# Patient Record
Sex: FEMALE | Race: WHITE | NOT HISPANIC OR LATINO | Employment: PART TIME | URBAN - NONMETROPOLITAN AREA
[De-identification: names, ages, dates, MRNs, and addresses within clinical notes are randomized per-mention and may not be internally consistent; named-entity substitution may affect disease eponyms.]

---

## 2021-02-22 ENCOUNTER — MEDICAL CORRESPONDENCE (OUTPATIENT)
Dept: HEALTH INFORMATION MANAGEMENT | Facility: HOSPITAL | Age: 56
End: 2021-02-22

## 2021-03-16 ENCOUNTER — TRANSFERRED RECORDS (OUTPATIENT)
Dept: HEALTH INFORMATION MANAGEMENT | Facility: CLINIC | Age: 56
End: 2021-03-16

## 2022-06-04 ENCOUNTER — HOSPITAL ENCOUNTER (EMERGENCY)
Facility: HOSPITAL | Age: 57
Discharge: HOME | End: 2022-06-04
Attending: PHYSICIAN ASSISTANT | Admitting: PHYSICIAN ASSISTANT
Payer: COMMERCIAL

## 2022-06-04 VITALS
DIASTOLIC BLOOD PRESSURE: 70 MMHG | SYSTOLIC BLOOD PRESSURE: 106 MMHG | HEART RATE: 96 BPM | TEMPERATURE: 100 F | RESPIRATION RATE: 16 BRPM

## 2022-06-04 DIAGNOSIS — B34.9 VIRAL ILLNESS: ICD-10-CM

## 2022-06-04 PROCEDURE — G0463 HOSPITAL OUTPT CLINIC VISIT: HCPCS

## 2022-06-04 PROCEDURE — C9803 HOPD COVID-19 SPEC COLLECT: HCPCS

## 2022-06-04 PROCEDURE — 99213 OFFICE O/P EST LOW 20 MIN: CPT | Performed by: PHYSICIAN ASSISTANT

## 2022-06-04 PROCEDURE — 87637 SARSCOV2&INF A&B&RSV AMP PRB: CPT | Performed by: PHYSICIAN ASSISTANT

## 2022-06-04 ASSESSMENT — ENCOUNTER SYMPTOMS
CHILLS: 1
MYALGIAS: 1
WHEEZING: 0
FEVER: 1
SORE THROAT: 1
CARDIOVASCULAR NEGATIVE: 1
HEADACHES: 0
NEUROLOGICAL NEGATIVE: 1
FATIGUE: 1
COUGH: 1
SHORTNESS OF BREATH: 0

## 2022-06-04 NOTE — ED TRIAGE NOTES
Patient presents to urgent care for fever, body aches, congestion, chills that started last night. Last dose tylenol 1600.

## 2022-06-04 NOTE — LETTER
[place] [address] [place] [id number]  Main: [phone]  Dept: [phone]      June 4, 2022      Re: Gabbie Ndiaye      TO WHOM IT MAY CONCERN:    Gabbie Ndiaye was evaluated today in urgent care. She does have a pending test, but may return to work 24 hrs after fevers have resolved if testing is negative. I expect the course if illness to range from 3-7 days. Please excuse Gabbie from any missed work during this time.         Sincerely,      VADIM Finley, PA-C   6/4/2022   5:59 PM

## 2022-06-04 NOTE — DISCHARGE INSTRUCTIONS
Zinc, vitamin D3 [id number] international unit(s) daily for the next 1-2 weeks.   Aspirin - 2 baby vs one 325mg for the next 2-3 weeks  Rotate ibuprofen 600-800mg with tylenol 1000mg every 4 hrs.   Sips of fluids throughout the day  Small frequent meals for salt/electrolytes & sugar/carbs  We will call with results - back here with worsening, onset shortness of breath, concerns.

## 2022-06-05 LAB
FLUAV RNA SPEC QL NAA+PROBE: NEGATIVE
FLUBV RNA RESP QL NAA+PROBE: NEGATIVE
RSV RNA SPEC NAA+PROBE: NEGATIVE
SARS-COV-2 RNA RESP QL NAA+PROBE: POSITIVE

## 2022-06-05 NOTE — ED PROVIDER NOTES
History     Chief Complaint   Patient presents with     Fever     Chills     Generalized Body Aches     HPI  Gabbie Ndiaye is a 57 year old female with PMHx of LBBB per her report, who presents ambulatory with acute onset of F/C, myalgia, congestion. Sx started last night with body aches, this AM congestion & ST. Denies any significant cough and no shortness of breath. She reports tylenol does help with fevers and body aches. Several residents have been CV19 positive, prompting visit for evaluation.      Allergies:  No Known Allergies    Problem List:    There are no problems to display for this patient.       Past Medical History:    No past medical history on file.    Past Surgical History:    No past surgical history on file.    Family History:    No family history on file.    Social History:  Marital Status:   [4]        Medications:    No current outpatient medications on file.        Review of Systems   Constitutional: Positive for chills, fatigue and fever.   HENT: Positive for congestion, postnasal drip and sore throat.    Respiratory: Positive for cough. Negative for shortness of breath and wheezing.    Cardiovascular: Negative.    Musculoskeletal: Positive for myalgias.   Skin: Negative for pallor.   Neurological: Negative.  Negative for headaches.       Physical Exam   BP: 106/70  Pulse: 96  Temp: 100  F (37.8  C)  Resp: 16      Physical Exam  Vitals and nursing note reviewed.   Constitutional:       General: She is not in acute distress.     Appearance: She is not toxic-appearing.   HENT:      Head: Normocephalic and atraumatic.      Right Ear: Tympanic membrane normal.      Left Ear: Tympanic membrane normal.      Mouth/Throat:      Mouth: Mucous membranes are moist.      Pharynx: Oropharynx is clear. No posterior oropharyngeal erythema.   Eyes:      Comments: Mild conjunctival injection bilaterally   Cardiovascular:      Rate and Rhythm: Normal rate and regular rhythm.      Heart sounds:  No murmur heard.  Pulmonary:      Effort: Pulmonary effort is normal.      Breath sounds: Normal breath sounds.   Skin:     General: Skin is warm and dry.   Neurological:      Mental Status: She is alert and oriented to person, place, and time.         ED Course       Assessments & Plan (with Medical Decision Making)     I have reviewed the nursing notes.  I have reviewed the findings, diagnosis, plan and need for follow up with the patient.    There are no discharge medications for this patient.      Final diagnoses:   Viral illness   Continue with supportive treatment.  Discussed return to work 24 hours after fevers have resolved versus current isolation guidelines if COVID-19 is positive.  Follow-up primary care next week with poor progression, seeking care here/ER sooner with worsening.  We will contact patient with swab results.    6/4/2022   [facility]     Ryland Whipple PA  06/04/22 2032

## 2022-06-06 ENCOUNTER — TELEPHONE (OUTPATIENT)
Dept: NURSING | Facility: CLINIC | Age: 57
End: 2022-06-06

## 2022-06-07 NOTE — TELEPHONE ENCOUNTER
Patient classified as COVID treatment eligible by Epic high risk algorithm:  No    Coronavirus (COVID-19) Notification    Reason for call  Notify of POSITIVE COVID-19 lab result, assess symptoms,  review [facility] recommendations    Lab Result   Lab test for 2019-nCoV rRt-PCR or SARS-COV-2 PCR  Oropharyngeal AND/OR nasopharyngeal swabs were POSITIVE for 2019-nCoV RNA [OR] SARS-COV-2 RNA (COVID-19) RNA     We have been unable to reach patient by phone at this time to notify of their Positive COVID-19 result.    Left voicemail message requesting a call back to [phone] [facility] for results.        A Positive COVID-19 letter will be sent via [employer] or the mail. (Exception, no letters sent to Presurgerical/Preprocedure Patients)    Tegan Francis LPN

## 2022-07-18 ENCOUNTER — HOSPITAL ENCOUNTER (EMERGENCY)
Facility: HOSPITAL | Age: 57
Discharge: HOME | End: 2022-07-18
Attending: FAMILY MEDICINE | Admitting: FAMILY MEDICINE
Payer: COMMERCIAL

## 2022-07-18 ENCOUNTER — APPOINTMENT (OUTPATIENT)
Dept: GENERAL RADIOLOGY | Facility: HOSPITAL | Age: 57
End: 2022-07-18
Attending: FAMILY MEDICINE
Payer: COMMERCIAL

## 2022-07-18 VITALS
HEART RATE: 82 BPM | SYSTOLIC BLOOD PRESSURE: 126 MMHG | RESPIRATION RATE: 18 BRPM | TEMPERATURE: 97.6 F | DIASTOLIC BLOOD PRESSURE: 92 MMHG | WEIGHT: 258 LBS | OXYGEN SATURATION: 96 %

## 2022-07-18 DIAGNOSIS — R00.2 PALPITATIONS: ICD-10-CM

## 2022-07-18 DIAGNOSIS — Z86.79 HISTORY OF LEFT BUNDLE BRANCH BLOCK (LBBB): ICD-10-CM

## 2022-07-18 LAB
ALBUMIN SERPL-MCNC: 3.5 G/DL (ref 3.4–5)
ALP SERPL-CCNC: 89 U/L (ref 40–150)
ALT SERPL W P-5'-P-CCNC: 34 U/L (ref 0–50)
ANION GAP SERPL CALCULATED.3IONS-SCNC: 5 MMOL/L (ref 3–14)
AST SERPL W P-5'-P-CCNC: 20 U/L (ref 0–45)
BASOPHILS # BLD AUTO: 0 10E3/UL (ref 0–0.2)
BASOPHILS NFR BLD AUTO: 1 %
BILIRUB SERPL-MCNC: 0.7 MG/DL (ref 0.2–1.3)
BUN SERPL-MCNC: 15 MG/DL (ref 7–30)
CALCIUM SERPL-MCNC: 8.9 MG/DL (ref 8.5–10.1)
CHLORIDE BLD-SCNC: 110 MMOL/L (ref 94–109)
CO2 SERPL-SCNC: 24 MMOL/L (ref 20–32)
CREAT SERPL-MCNC: 0.52 MG/DL (ref 0.52–1.04)
EOSINOPHIL # BLD AUTO: 0.1 10E3/UL (ref 0–0.7)
EOSINOPHIL NFR BLD AUTO: 2 %
ERYTHROCYTE [DISTWIDTH] IN BLOOD BY AUTOMATED COUNT: 14.1 % (ref 10–15)
GFR SERPL CREATININE-BSD FRML MDRD: >90 ML/MIN/1.73M2
GLUCOSE BLD-MCNC: 100 MG/DL (ref 70–99)
HCT VFR BLD AUTO: 42.1 % (ref 35–47)
HGB BLD-MCNC: 13.9 G/DL (ref 11.7–15.7)
HOLD SPECIMEN: NORMAL
IMM GRANULOCYTES # BLD: 0 10E3/UL
IMM GRANULOCYTES NFR BLD: 0 %
LYMPHOCYTES # BLD AUTO: 2 10E3/UL (ref 0.8–5.3)
LYMPHOCYTES NFR BLD AUTO: 34 %
MCH RBC QN AUTO: 28.7 PG (ref 26.5–33)
MCHC RBC AUTO-ENTMCNC: 33 G/DL (ref 31.5–36.5)
MCV RBC AUTO: 87 FL (ref 78–100)
MONOCYTES # BLD AUTO: 0.6 10E3/UL (ref 0–1.3)
MONOCYTES NFR BLD AUTO: 10 %
NEUTROPHILS # BLD AUTO: 3.1 10E3/UL (ref 1.6–8.3)
NEUTROPHILS NFR BLD AUTO: 53 %
NRBC # BLD AUTO: 0 10E3/UL
NRBC BLD AUTO-RTO: 0 /100
PLATELET # BLD AUTO: 343 10E3/UL (ref 150–450)
POTASSIUM BLD-SCNC: 3.8 MMOL/L (ref 3.4–5.3)
PROT SERPL-MCNC: 7.3 G/DL (ref 6.8–8.8)
RBC # BLD AUTO: 4.84 10E6/UL (ref 3.8–5.2)
SODIUM SERPL-SCNC: 139 MMOL/L (ref 133–144)
T4 FREE SERPL-MCNC: 0.93 NG/DL (ref 0.76–1.46)
TROPONIN I SERPL HS-MCNC: 8 NG/L
TSH SERPL DL<=0.005 MIU/L-ACNC: 0.31 MU/L (ref 0.4–4)
WBC # BLD AUTO: 5.8 10E3/UL (ref 4–11)

## 2022-07-18 PROCEDURE — 93005 ELECTROCARDIOGRAM TRACING: CPT

## 2022-07-18 PROCEDURE — 85025 COMPLETE CBC W/AUTO DIFF WBC: CPT | Performed by: NURSE PRACTITIONER

## 2022-07-18 PROCEDURE — 93010 ELECTROCARDIOGRAM REPORT: CPT | Performed by: INTERNAL MEDICINE

## 2022-07-18 PROCEDURE — 99284 EMERGENCY DEPT VISIT MOD MDM: CPT | Performed by: FAMILY MEDICINE

## 2022-07-18 PROCEDURE — 84484 ASSAY OF TROPONIN QUANT: CPT | Performed by: NURSE PRACTITIONER

## 2022-07-18 PROCEDURE — 36415 COLL VENOUS BLD VENIPUNCTURE: CPT | Performed by: NURSE PRACTITIONER

## 2022-07-18 PROCEDURE — 99285 EMERGENCY DEPT VISIT HI MDM: CPT | Mod: 25

## 2022-07-18 PROCEDURE — 84439 ASSAY OF FREE THYROXINE: CPT | Performed by: NURSE PRACTITIONER

## 2022-07-18 PROCEDURE — 71045 X-RAY EXAM CHEST 1 VIEW: CPT

## 2022-07-18 PROCEDURE — 84443 ASSAY THYROID STIM HORMONE: CPT | Performed by: NURSE PRACTITIONER

## 2022-07-18 PROCEDURE — 84132 ASSAY OF SERUM POTASSIUM: CPT | Performed by: NURSE PRACTITIONER

## 2022-07-18 NOTE — ED PROVIDER NOTES
History     Chief Complaint   Patient presents with     Palpitations     HPI  Gabbie Ndiaye is a 57 year old female who presents with intermittent palpitations for last few days.  Patient denies chest pain, she did have some shortness of breath during palpitation although when she arrived to ED she denied any palpitation or chest pain or shortness of breath.  She reports no fever or chills, no cough, no abdominal pain or nausea vomiting.  She reports history of smoking in the past, she also started vaping recently.  No peripheral edema, no weakness, no headache or lightheadedness.  Patient reports history of left bundle branch block per EKG    Allergies:  No Known Allergies    Problem List:    There are no problems to display for this patient.       Past Medical History:    No past medical history on file.    Past Surgical History:    No past surgical history on file.    Family History:    No family history on file.    Social History:  Marital Status:   [4]        Medications:    No current outpatient medications on file.        Review of Systems  All systems reviewed and pertinent positives indicated history present illness otherwise negative  Physical Exam   BP: 126/92  Pulse: 82  Temp: 97.6  F (36.4  C)  Resp: 18  Weight: 117 kg (258 lb)  SpO2: 96 %      Physical Exam  Vitals and nursing note reviewed.   Constitutional:       Appearance: Normal appearance. She is not ill-appearing or diaphoretic.   HENT:      Mouth/Throat:      Mouth: Mucous membranes are moist.      Pharynx: Oropharynx is clear.   Eyes:      Conjunctiva/sclera: Conjunctivae normal.      Pupils: Pupils are equal, round, and reactive to light.   Cardiovascular:      Rate and Rhythm: Normal rate.      Pulses: Normal pulses.      Heart sounds: Normal heart sounds.   Pulmonary:      Effort: Pulmonary effort is normal.   Abdominal:      General: Abdomen is flat. Bowel sounds are normal. There is no distension.      Tenderness: There is  no abdominal tenderness.   Musculoskeletal:         General: Normal range of motion.      Cervical back: Normal range of motion and neck supple.      Right lower leg: No edema.      Left lower leg: No edema.   Skin:     General: Skin is warm and dry.      Capillary Refill: Capillary refill takes less than 2 seconds.   Neurological:      General: No focal deficit present.      Mental Status: She is alert and oriented to person, place, and time. Mental status is at baseline.      Cranial Nerves: No cranial nerve deficit.      Sensory: No sensory deficit.      Motor: No weakness.   Psychiatric:         Mood and Affect: Mood normal.         ED Course        57-year-old female who presents with heart palpitation for last few days.  On arrival patient denies any chest pain shortness of breath or palpitation.  Diagnostic tests CBC CMP troponin level unremarkable, TSH level at lower level, reflex T4 normal  EKG with normal sinus rhythm, heart rate 73/min, left bundle branch block present which per patient is chronic.  CXR portable 1 view negative for acute abnormal cardiopulmonary findings  Patient recommended to call her primary care provider this week for follow-up visit and look for referral for cardiology evaluation     Procedures                  Results for orders placed or performed during the hospital encounter of 07/18/22 (from the past 24 hour(s))   CBC with platelets differential    Narrative    The following orders were created for panel order CBC with platelets differential.  Procedure                               Abnormality         Status                     ---------                               -----------         ------                     CBC with platelets and d...[[id number]]                      Final result                 Please view results for these tests on the individual orders.   Comprehensive metabolic panel   Result Value Ref Range    Sodium 139 133 - 144 mmol/L    Potassium 3.8 3.4 - 5.3  mmol/L    Chloride 110 (H) 94 - 109 mmol/L    Carbon Dioxide (CO2) 24 20 - 32 mmol/L    Anion Gap 5 3 - 14 mmol/L    Urea Nitrogen 15 7 - 30 mg/dL    Creatinine 0.52 0.52 - 1.04 mg/dL    Calcium 8.9 8.5 - 10.1 mg/dL    Glucose 100 (H) 70 - 99 mg/dL    Alkaline Phosphatase 89 40 - 150 U/L    AST 20 0 - 45 U/L    ALT 34 0 - 50 U/L    Protein Total 7.3 6.8 - 8.8 g/dL    Albumin 3.5 3.4 - 5.0 g/dL    Bilirubin Total 0.7 0.2 - 1.3 mg/dL    GFR Estimate >90 >60 mL/min/1.73m2   TSH with free T4 reflex   Result Value Ref Range    TSH 0.31 (L) 0.40 - 4.00 mU/L   Troponin I   Result Value Ref Range    Troponin I High Sensitivity 8 <54 ng/L   CBC with platelets and differential   Result Value Ref Range    WBC Count 5.8 4.0 - 11.0 10e3/uL    RBC Count 4.84 3.80 - 5.20 10e6/uL    Hemoglobin 13.9 11.7 - 15.7 g/dL    Hematocrit 42.1 35.0 - 47.0 %    MCV 87 78 - 100 fL    MCH 28.7 26.5 - 33.0 pg    MCHC 33.0 31.5 - 36.5 g/dL    RDW 14.1 10.0 - 15.0 %    Platelet Count 343 150 - 450 10e3/uL    % Neutrophils 53 %    % Lymphocytes 34 %    % Monocytes 10 %    % Eosinophils 2 %    % Basophils 1 %    % Immature Granulocytes 0 %    NRBCs per 100 WBC 0 <1 /100    Absolute Neutrophils 3.1 1.6 - 8.3 10e3/uL    Absolute Lymphocytes 2.0 0.8 - 5.3 10e3/uL    Absolute Monocytes 0.6 0.0 - 1.3 10e3/uL    Absolute Eosinophils 0.1 0.0 - 0.7 10e3/uL    Absolute Basophils 0.0 0.0 - 0.2 10e3/uL    Absolute Immature Granulocytes 0.0 <=0.4 10e3/uL    Absolute NRBCs 0.0 10e3/uL   T4 free   Result Value Ref Range    Free T4 0.93 0.76 - 1.46 ng/dL   Extra Tube    Narrative    The following orders were created for panel order Extra Tube.  Procedure                               Abnormality         Status                     ---------                               -----------         ------                     Extra Blue Top Tube[[id number]]                              Final result               Extra Red Top Tube[[id number]]                                Final result               Extra Green Top (Lithium...[[id number]]                      Final result                 Please view results for these tests on the individual orders.   Extra Green Top (Lithium Heparin) ON ICE   Result Value Ref Range    Hold Specimen JIC    Extra Blue Top Tube   Result Value Ref Range    Hold Specimen jic    Extra Red Top Tube   Result Value Ref Range    Hold Specimen JIC    XR Chest Port 1 View    Narrative    PROCEDURE:  XR CHEST PORT 1 VIEW    HISTORY: palpitation.    COMPARISON:  None.    FINDINGS:  Lines: None.  Heart/mediastinum: The cardiomediastinal contours are within normal  limits.    Lungs and pleural spaces: No focal consolidation, effusion or  pneumothorax.  Bones and soft tissues: No acute osseous abnormality. No upper  abdominal free air.      Impression    IMPRESSION:  No acute cardiopulmonary abnormality.      CHRISTIAN SIEGEL MD         SYSTEM ID:  [id number]       Medications - No data to display    Assessments & Plan (with Medical Decision Making)     I have reviewed the nursing notes.    I have reviewed the findings, diagnosis, plan and need for follow up with the patient.      New Prescriptions    No medications on file       Final diagnoses:   Palpitations   History of left bundle branch block (LBBB)       7/18/2022   [facility]     Mohamud Henderson MD  07/18/22 1605

## 2022-07-18 NOTE — ED TRIAGE NOTES
"56 y/o female who presents with reports of palpitations since Saturday. She describes as a \"heavy pounding heart beat.\" She denies chest pain or pressure. She states she feels slightly short of breath and has a headache. She reports a hx of a LBB.       "

## 2022-09-20 NOTE — PROGRESS NOTES
"  Assessment & Plan     Establish care  Chart updated.  Will return patient convenience for annual physical/healthcare maintenance/cancer screening.    Palpitations  Hx of left bundle branch block  History of known LBBB since at least 2017.  Has had persistent intermittent mild palpitations without presyncopal symptoms or chest pain for several years.  Seems to have decreasing stamina also associated with significant weight gain.  No red flag symptoms in the setting of known LBBB so start with basic lab screening, and cardiology referral.  - EKG 12-lead complete w/read - (Clinic Performed)  - Likely needs echocardiogram, consider Holter monitor  - Adult Cardiology Eval  Referral  - TSH with free T4 reflex  - Comprehensive metabolic panel (BMP + Alb, Alk Phos, ALT, AST, Total. Bili, TP)  - CBC with platelets and differential    BMI 40.0-44.9, adult (H)  Morbid obesity (H)  Roughly 50 pound weight gain over the past 2 to 3 years in the setting of sedentary lifestyle changes with new employment during the pandemic.  Has also been anxious about exertional activity prior to getting a cardiac work-up.  No significant family history of obesity or diabetes.  Start with last clinic day and likely refer to medical weight loss clinic in the future.  - TSH with free T4 reflex  - Hemoglobin A1c  - Lipid Profile (Chol, Trig, HDL, LDL calc)  - Consider referral to nutrition, medical weight loss, bariatric surgery    BMI:   Estimated body mass index is 44.44 kg/m  as calculated from the following:    Height as of this encounter: 1.626 m (5' 4\").    Weight as of this encounter: 117.4 kg (258 lb 14.4 oz).   Weight management plan: Discussed healthy diet and exercise guidelines      Return in about 4 weeks (around 10/19/2022) for Annual physical.    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  -Not on any daily medications at this time    Hx of LBBB:  Palpitations:  -Reported history of known left bundle branch block since at least 2017  -Struggled with mild, intermittent, but persistent palpitations for several years  -ED encounter on 7/18/2022 for palpitations with negative ischemic work-up at that time  -Episodes of feeling her heart speeding up and some central chest heaviness occur almost daily and at least weekly  -Episodes are brief, not associated with pain, headache, dizziness, lightheadedness, syncope  -Symptoms have occurred at rest and with activity, no nighttime episodes  -Episodes seem to be associated with stress/anxiety  -Seemed to be reduced with increased focus on hydration  -No family history of heart disease, mother had stroke sometime in her 70s  -No history of hypertension  -Former smoker, no recreational substance use  -On a couple of vitamin supplements but no other medications  -Has been referred to cardiology in the past but has been lost to follow-up; no prior Holter monitor or echo    Obesity:  -Struggling with her weight for the last couple years; 40 to 50 pound weight gain  -Clearly defined lifestyle changes including switching to a sedentary job, the pandemic, poor eating habits  -[place]  -Has always struggled with large pelvis and thighs but seems to be doing worse lately  -Knows that she needs to exercise more but has been too worried about her heart to do any significant exertion  -Known family history of obesity, diabetes, dyslipidemia    Review of Systems   Constitutional, HEENT, cardiovascular, pulmonary, GI, , musculoskeletal, neuro, skin, endocrine and psych systems are negative, except as otherwise  "noted.      Objective    /88 (BP Location: Left arm, Patient Position: Sitting, Cuff Size: Adult Regular)   Pulse 80   Temp 97.2  F (36.2  C) (Tympanic)   Ht 1.626 m (5' 4\")   Wt 117.4 kg (258 lb 14.4 oz)   SpO2 98%   BMI 44.44 kg/m    Body mass index is 44.44 kg/m .  Physical Exam   GENERAL: healthy, alert and no distress  NECK: no adenopathy, no asymmetry, masses, or scars and thyroid normal to palpation  RESP: lungs clear to auscultation - no rales, rhonchi or wheezes  CV: regular rate and rhythm, normal S1 S2, no S3 or S4, no murmur, click or rub, 1+ edema bilateral lower extremities and peripheral pulses strong  ABDOMEN: Obese, soft, nontender, no hepatosplenomegaly, no masses and bowel sounds normal  MS: no gross musculoskeletal defects noted, 1+ edema bilaterally, mild varicose vein disease left lower extremity  PSYCH: mentation appears normal, affect normal/bright      "

## 2022-09-21 ENCOUNTER — OFFICE VISIT (OUTPATIENT)
Dept: FAMILY MEDICINE | Facility: OTHER | Age: 57
End: 2022-09-21
Attending: STUDENT IN AN ORGANIZED HEALTH CARE EDUCATION/TRAINING PROGRAM
Payer: COMMERCIAL

## 2022-09-21 VITALS
HEART RATE: 80 BPM | HEIGHT: 64 IN | SYSTOLIC BLOOD PRESSURE: 122 MMHG | OXYGEN SATURATION: 98 % | DIASTOLIC BLOOD PRESSURE: 88 MMHG | TEMPERATURE: 97.2 F | BODY MASS INDEX: 44.2 KG/M2 | WEIGHT: 258.9 LBS

## 2022-09-21 DIAGNOSIS — R00.2 PALPITATIONS: ICD-10-CM

## 2022-09-21 DIAGNOSIS — Z76.89 ENCOUNTER TO ESTABLISH CARE: Primary | ICD-10-CM

## 2022-09-21 DIAGNOSIS — Z86.79 HX OF LEFT BUNDLE BRANCH BLOCK: ICD-10-CM

## 2022-09-21 DIAGNOSIS — E66.01 MORBID OBESITY (H): ICD-10-CM

## 2022-09-21 PROBLEM — M72.2 PLANTAR FASCIITIS: Status: ACTIVE | Noted: 2022-09-21

## 2022-09-21 PROBLEM — Z87.891 FORMER CIGARETTE SMOKER: Status: ACTIVE | Noted: 2022-09-21

## 2022-09-21 PROBLEM — M77.30 HEEL SPUR, UNSPECIFIED LATERALITY: Status: ACTIVE | Noted: 2022-09-21

## 2022-09-21 LAB
ALBUMIN SERPL-MCNC: 3.6 G/DL (ref 3.4–5)
ALP SERPL-CCNC: 85 U/L (ref 40–150)
ALT SERPL W P-5'-P-CCNC: 27 U/L (ref 0–50)
ANION GAP SERPL CALCULATED.3IONS-SCNC: 5 MMOL/L (ref 3–14)
AST SERPL W P-5'-P-CCNC: 14 U/L (ref 0–45)
BASOPHILS # BLD AUTO: 0 10E3/UL (ref 0–0.2)
BASOPHILS NFR BLD AUTO: 1 %
BILIRUB SERPL-MCNC: 0.6 MG/DL (ref 0.2–1.3)
BUN SERPL-MCNC: 15 MG/DL (ref 7–30)
CALCIUM SERPL-MCNC: 8.6 MG/DL (ref 8.5–10.1)
CHLORIDE BLD-SCNC: 110 MMOL/L (ref 94–109)
CHOLEST SERPL-MCNC: 179 MG/DL
CO2 SERPL-SCNC: 25 MMOL/L (ref 20–32)
CREAT SERPL-MCNC: 0.57 MG/DL (ref 0.52–1.04)
EOSINOPHIL # BLD AUTO: 0.1 10E3/UL (ref 0–0.7)
EOSINOPHIL NFR BLD AUTO: 2 %
ERYTHROCYTE [DISTWIDTH] IN BLOOD BY AUTOMATED COUNT: 14.2 % (ref 10–15)
EST. AVERAGE GLUCOSE BLD GHB EST-MCNC: 114 MG/DL
FASTING STATUS PATIENT QL REPORTED: NO
GFR SERPL CREATININE-BSD FRML MDRD: >90 ML/MIN/1.73M2
GLUCOSE BLD-MCNC: 96 MG/DL (ref 70–99)
HBA1C MFR BLD: 5.6 % (ref 0–5.6)
HCT VFR BLD AUTO: 42.6 % (ref 35–47)
HDLC SERPL-MCNC: 46 MG/DL
HGB BLD-MCNC: 14.1 G/DL (ref 11.7–15.7)
IMM GRANULOCYTES # BLD: 0 10E3/UL
IMM GRANULOCYTES NFR BLD: 0 %
LDLC SERPL CALC-MCNC: 111 MG/DL
LYMPHOCYTES # BLD AUTO: 1.9 10E3/UL (ref 0.8–5.3)
LYMPHOCYTES NFR BLD AUTO: 32 %
MCH RBC QN AUTO: 28.8 PG (ref 26.5–33)
MCHC RBC AUTO-ENTMCNC: 33.1 G/DL (ref 31.5–36.5)
MCV RBC AUTO: 87 FL (ref 78–100)
MONOCYTES # BLD AUTO: 0.6 10E3/UL (ref 0–1.3)
MONOCYTES NFR BLD AUTO: 10 %
NEUTROPHILS # BLD AUTO: 3.3 10E3/UL (ref 1.6–8.3)
NEUTROPHILS NFR BLD AUTO: 55 %
NONHDLC SERPL-MCNC: 133 MG/DL
NRBC # BLD AUTO: 0 10E3/UL
NRBC BLD AUTO-RTO: 0 /100
PLATELET # BLD AUTO: 338 10E3/UL (ref 150–450)
POTASSIUM BLD-SCNC: 4.2 MMOL/L (ref 3.4–5.3)
PROT SERPL-MCNC: 7.3 G/DL (ref 6.8–8.8)
RBC # BLD AUTO: 4.89 10E6/UL (ref 3.8–5.2)
SODIUM SERPL-SCNC: 140 MMOL/L (ref 133–144)
TRIGL SERPL-MCNC: 110 MG/DL
TSH SERPL DL<=0.005 MIU/L-ACNC: 0.46 MU/L (ref 0.4–4)
WBC # BLD AUTO: 5.9 10E3/UL (ref 4–11)

## 2022-09-21 PROCEDURE — G0463 HOSPITAL OUTPT CLINIC VISIT: HCPCS

## 2022-09-21 PROCEDURE — 80061 LIPID PANEL: CPT | Mod: ZL | Performed by: STUDENT IN AN ORGANIZED HEALTH CARE EDUCATION/TRAINING PROGRAM

## 2022-09-21 PROCEDURE — 99204 OFFICE O/P NEW MOD 45 MIN: CPT | Performed by: STUDENT IN AN ORGANIZED HEALTH CARE EDUCATION/TRAINING PROGRAM

## 2022-09-21 PROCEDURE — 82040 ASSAY OF SERUM ALBUMIN: CPT | Mod: ZL | Performed by: STUDENT IN AN ORGANIZED HEALTH CARE EDUCATION/TRAINING PROGRAM

## 2022-09-21 PROCEDURE — 36415 COLL VENOUS BLD VENIPUNCTURE: CPT | Mod: ZL | Performed by: STUDENT IN AN ORGANIZED HEALTH CARE EDUCATION/TRAINING PROGRAM

## 2022-09-21 PROCEDURE — 85025 COMPLETE CBC W/AUTO DIFF WBC: CPT | Mod: ZL | Performed by: STUDENT IN AN ORGANIZED HEALTH CARE EDUCATION/TRAINING PROGRAM

## 2022-09-21 PROCEDURE — 83036 HEMOGLOBIN GLYCOSYLATED A1C: CPT | Mod: ZL | Performed by: STUDENT IN AN ORGANIZED HEALTH CARE EDUCATION/TRAINING PROGRAM

## 2022-09-21 PROCEDURE — 80053 COMPREHEN METABOLIC PANEL: CPT | Mod: ZL | Performed by: STUDENT IN AN ORGANIZED HEALTH CARE EDUCATION/TRAINING PROGRAM

## 2022-09-21 PROCEDURE — 84443 ASSAY THYROID STIM HORMONE: CPT | Mod: ZL | Performed by: STUDENT IN AN ORGANIZED HEALTH CARE EDUCATION/TRAINING PROGRAM

## 2022-09-21 RX ORDER — ACETAMINOPHEN 500 MG
500-1000 TABLET ORAL EVERY 6 HOURS PRN
COMMUNITY

## 2022-09-21 ASSESSMENT — PAIN SCALES - GENERAL: PAINLEVEL: NO PAIN (0)

## 2022-09-21 NOTE — NURSING NOTE
"Chief Complaint   Patient presents with     Establish Care       Initial /88 (BP Location: Left arm, Patient Position: Sitting, Cuff Size: Adult Regular)   Pulse 80   Temp 97.2  F (36.2  C) (Tympanic)   Ht 1.626 m (5' 4\")   Wt 117.4 kg (258 lb 14.4 oz)   SpO2 98%   BMI 44.44 kg/m   Estimated body mass index is 44.44 kg/m  as calculated from the following:    Height as of this encounter: 1.626 m (5' 4\").    Weight as of this encounter: 117.4 kg (258 lb 14.4 oz).  Medication Reconciliation: complete  Danica Rivera LPN  "

## 2022-09-23 ENCOUNTER — TELEPHONE (OUTPATIENT)
Dept: FAMILY MEDICINE | Facility: OTHER | Age: 57
End: 2022-09-23

## 2022-09-23 NOTE — TELEPHONE ENCOUNTER
9/23 attempted to reach pt advising of referral for cardiology but no answer and no voicemail will send letter; please schedule new pt appt with any cardio provider.

## 2022-10-25 NOTE — PROGRESS NOTES
SUBJECTIVE:   CC: Gabbie is an 57 year old who presents for preventive health visit.     Palpitations:  -Seen 9/21 for ED follow-up for progressive episodes of palpitations  -Cardiology referral placed at that time but patient has not scheduled  -Has been working on improved hydration which seems to help with the intensity of palpitations  -Still having episodes almost daily  -Does acknowledge some anxiety based component  -No chest pain, dizziness, headache, lightheadedness, shortness of breath, peripheral edema  -Scared to do physical exertion out of concern of untreated heart condition    Obesity:  -Has gained about 50 pounds over the past 2 to 3 years in association with more sedentary lifestyle  -Overall feels like she eats very healthy diet  -Last get back into a regular cardiovascular exercise routine  -Interested in trial of any available medications to kick start her weight loss  -No Pap, minimal candy, occasional crackers/chips    Anxiety:  -Overall stable, but feels on edge much of the time  -Feels pulled in multiple directions between work and home life  -No depressive symptoms  -Optimistic that she has anxiety because she has so many things going on in life rather than depressive symptoms because she has nothing going on  -Acknowledges that some of her anxiety is health related  -Does not feel like she needs medication or counseling at this time    Patient has been advised of split billing requirements and indicates understanding: Yes  Healthy Habits:     Getting at least 3 servings of Calcium per day:  NO    Bi-annual eye exam:  NO    Dental care twice a year:  NO    Sleep apnea or symptoms of sleep apnea:  Daytime drowsiness    Diet:  Regular (no restrictions)    Frequency of exercise:  2-3 days/week    Duration of exercise:  15-30 minutes    Taking medications regularly:  Yes    Medication side effects:  None    PHQ-2 Total Score: 3    Additional concerns today:  No    Today's PHQ-2 Score:   PHQ-2  ( 1999 Pf[employer]) 10/27/2022   Q1: Little interest or pleasure in doing things 2   Q2: Feeling down, depressed or hopeless 1   PHQ-2 Score 3   Q1: Little interest or pleasure in doing things More than half the days   Q2: Feeling down, depressed or hopeless Several days   PHQ-2 Score 3       Abuse: Current or Past (Physical, Sexual or Emotional) - No  Do you feel safe in your environment? Yes    Have you ever done Advance Care Planning? (For example, a Health Directive, POLST, or a discussion with a medical provider or your loved ones about your wishes): No, advance care planning information given to patient to review.  Patient declined advance care planning discussion at this time.    Social History     Tobacco Use     Smoking status: Former     Smokeless tobacco: Not on file   Substance Use Topics     Alcohol use: Yes     Comment: occassionally 1 beer     If you drink alcohol do you typically have >3 drinks per day or >7 drinks per week? No    Alcohol Use 10/27/2022   Prescreen: >3 drinks/day or >7 drinks/week? No   Prescreen: >3 drinks/day or >7 drinks/week? -   No flowsheet data found.    Reviewed orders with patient.  Reviewed health maintenance and updated orders accordingly - Yes  Labs reviewed in EPIC    Breast Cancer Screening:  Any new diagnosis of family breast, ovarian, or bowel cancer? No    FHS-7: No flowsheet data found.  Mammogram Screening: Recommended mammography every 1-2 years with patient discussion and risk factor consideration  Pertinent mammograms are reviewed under the imaging tab.    History of abnormal Pap smear: Unknown     Reviewed and updated as needed this visit by clinical staff   Tobacco  Allergies  Meds   Med Hx  Surg Hx  Fam Hx  Soc Hx        Reviewed and updated as needed this visit by Provider                 All positive ROS chronic conditions. Recommend follow up to further address.  Review of Systems   Constitutional: Negative for chills and fever.   HENT: Positive for  "congestion and ear pain. Negative for hearing loss and sore throat.    Eyes: Positive for pain and visual disturbance.   Respiratory: Negative for cough and shortness of breath.    Cardiovascular: Positive for chest pain, palpitations and peripheral edema.   Gastrointestinal: Positive for constipation and heartburn. Negative for abdominal pain, diarrhea, hematochezia and nausea.   Breasts:  Positive for tenderness. Negative for breast mass and discharge.   Genitourinary: Negative for dysuria, frequency, genital sores, hematuria, pelvic pain, urgency, vaginal bleeding and vaginal discharge.   Musculoskeletal: Positive for arthralgias. Negative for joint swelling and myalgias.   Skin: Negative for rash.   Neurological: Positive for dizziness, weakness, headaches and paresthesias.   Psychiatric/Behavioral: Positive for mood changes. The patient is nervous/anxious.         OBJECTIVE:   /76 (BP Location: Right arm, Patient Position: Chair, Cuff Size: Adult Regular)   Pulse 85   Temp 97.5  F (36.4  C)   Ht 1.626 m (5' 4\")   Wt 117.9 kg (260 lb)   SpO2 98%   BMI 44.63 kg/m    Physical Exam  GENERAL APPEARANCE: healthy, alert and no distress  EYES: Eyes grossly normal to inspection, PERRL and conjunctivae and sclerae normal  HENT: ear canals and TM's normal, nose and mouth without ulcers or lesions, oropharynx clear and oral mucous membranes moist, dentures in place  NECK: no adenopathy, no asymmetry, masses, or scars and thyroid normal to palpation  RESP: lungs clear to auscultation - no rales, rhonchi or wheezes  BREAST: normal without masses, tenderness or nipple discharge and no palpable axillary masses or adenopathy  CV: regular rate and rhythm, normal S1 S2, no S3 or S4, no murmur, click or rub, no peripheral edema and peripheral pulses strong  ABDOMEN: Obese, soft, nontender, no hepatosplenomegaly, no masses and bowel sounds normal   (female): normal female external genitalia, normal urethral meatus, " "vaginal mucosal atrophy noted, normal cervix, adnexae, and uterus without masses or abnormal discharge  MS: no musculoskeletal defects are noted and gait is age appropriate without ataxia  SKIN: no suspicious lesions or rashes  NEURO: Normal strength and tone, sensory exam grossly normal, mentation intact and speech normal  PSYCH: mentation appears normal and affect normal/bright    Diagnostic Test Results:  Labs reviewed in Epic    ASSESSMENT/PLAN:   (Z00.00) Routine general medical examination at a health care facility  (primary encounter diagnosis)  Plan: Reviewed CBC, CMP, lipid profile, TSH, A1c from 9/21/2022   Return 1 year for annual physical    (Z00.00) Healthcare maintenance  - BP: Normotensive, asymptomatic  - BMI: See below.  Estimated body mass index is 44.63 kg/m  as calculated from the following:    Height as of this encounter: 1.626 m (5' 4\").    Weight as of this encounter: 117.9 kg (260 lb).    - ASCVD risk screening: Discussed healthy diet and exercise recommendations   The 10-year ASCVD risk score (Chavo NOVAK, et al., 2019) is: 2.1%    Values used to calculate the score:      Age: 57 years      Sex: Female      Is Non- : No      Diabetic: No      Tobacco smoker: No      Systolic Blood Pressure: 122 mmHg      Is BP treated: No      HDL Cholesterol: 46 mg/dL      Total Cholesterol: 179 mg/dL  - Mood: See below  PHQ-2 ( 1999 Pfizer) 10/27/2022   Q1: Little interest or pleasure in doing things 2   Q2: Feeling down, depressed or hopeless 1   PHQ-2 Score 3   Q1: Little interest or pleasure in doing things More than half the days   Q2: Feeling down, depressed or hopeless Several days   PHQ-2 Score 3     - Tobacco/substance screening:     Tobacco Use      Smoking status: Former      Smokeless tobacco: None    - Infectious disease screening: Low risk, screening declined  - Cancer screening:              -Family history: no fam hx cancer <50   -Breast: Mammogram ordered   -Lung: CT " lung ordered   -Colon: Cologuard ordered   -Cervix: Pap smear today  - Immunizations: Declined    (R00.2) Palpitations  (Z86.79) Hx of left bundle branch block  Comment: Seen 9/21 for follow-up of ED encounter for palpitations.  History of known LBBB since 2017 with persistent and progressive palpitations over the past several months.  Has had some interval improvement with improved hydration.  No red flag symptoms.  CBC, CMP, TSH normal.   Plan: Echocardiogram Complete, Adult Leadless EKG         Monitor 3 to 7 Days, EKG 12-lead complete         w/read - (Clinic Performed)   Plans to return call to cardiology for scheduling (referral order placed 9/12/2022)     (Z68.41) BMI 40.0-44.9, adult (H)  (E66.01) Morbid obesity (H)  Comment: Significant weight gain over the past 2 to 3 years with change to sedentary lifestyle.  Has been working diligently with some lifestyle improvements but struggles to lose any weight.  Will trial GLP-1 inhibitor pending insurance coverage.  TSH, A1c, lipids reassuring.  Plan: semaglutide (OZEMPIC) 2 MG/1.5ML SOPN pen        Consider metformin, consider referral to medical weight loss    (F41.1) Generalized anxiety disorder  Comment: Stable.  Persistent.  Discussed self coping mechanisms and management techniques.  Declines further support at this time.  Plan: Offered counseling, pharmacotherapy    (Z12.31) Encounter for screening mammogram for breast cancer  Plan: MA Screen Bilateral w/Farhan    (Z12.11) Screening for malignant neoplasm of colon  Comment: Counseled patient on risks and benefits of various colon cancer screening; opts for Cologuard and understands the need for scope if indicated  Plan: COLOGUARD([employer])          (Z12.2,  Z87.891) Encounter for screening for malignant neoplasm of lung in former smoker who quit in past 15 years with 30 pack year history or greater  Plan: CT Chest Lung Cancer Scrn Low Dose wo    (Z12.4) Screening for malignant neoplasm of cervix  Plan:  "A pap thin layer screen with  HPV - recommended        age 30 - 65 years    Patient has been advised of split billing requirements and indicates understanding: Yes      COUNSELING:  Reviewed preventive health counseling, as reflected in patient instructions  Special attention given to:        Regular exercise       Healthy diet/nutrition       Vision screening       Colorectal Cancer Screening       Consider lung cancer screening for ages 55-80 years (77 for Medicare) and 20 pack-year smoking history     Estimated body mass index is 44.63 kg/m  as calculated from the following:    Height as of this encounter: 1.626 m (5' 4\").    Weight as of this encounter: 117.9 kg (260 lb).    Weight management plan: Discussed healthy diet and exercise guidelines    She reports that she has quit smoking. She does not have any smokeless tobacco history on file.      Counseling Resources:  ATP IV Guidelines  Pooled Cohorts Equation Calculator  Breast Cancer Risk Calculator  BRCA-Related Cancer Risk Assessment: FHS-7 Tool  FRAX Risk Assessment  ICSI Preventive Guidelines  Dietary Guidelines for Americans, 2010  [employer]'s MyPlate  ASA Prophylaxis  Lung CA Screening    Arya Cee MD  [facility] - [place]  Answers for HPI/ROS submitted by the patient on 10/27/2022  If you checked off any problems, how difficult have these problems made it for you to do your work, take care of things at home, or get along with other people?: Not difficult at all  PHQ9 TOTAL SCORE: 13      "

## 2022-10-27 ENCOUNTER — OFFICE VISIT (OUTPATIENT)
Dept: FAMILY MEDICINE | Facility: OTHER | Age: 57
End: 2022-10-27
Attending: STUDENT IN AN ORGANIZED HEALTH CARE EDUCATION/TRAINING PROGRAM
Payer: COMMERCIAL

## 2022-10-27 VITALS
OXYGEN SATURATION: 98 % | WEIGHT: 260 LBS | HEART RATE: 85 BPM | BODY MASS INDEX: 44.39 KG/M2 | TEMPERATURE: 97.5 F | DIASTOLIC BLOOD PRESSURE: 76 MMHG | SYSTOLIC BLOOD PRESSURE: 122 MMHG | HEIGHT: 64 IN

## 2022-10-27 DIAGNOSIS — E66.01 MORBID OBESITY (H): ICD-10-CM

## 2022-10-27 DIAGNOSIS — F41.1 GENERALIZED ANXIETY DISORDER: ICD-10-CM

## 2022-10-27 DIAGNOSIS — Z00.00 ROUTINE GENERAL MEDICAL EXAMINATION AT A HEALTH CARE FACILITY: Primary | ICD-10-CM

## 2022-10-27 DIAGNOSIS — Z00.00 HEALTHCARE MAINTENANCE: ICD-10-CM

## 2022-10-27 DIAGNOSIS — Z12.4 SCREENING FOR MALIGNANT NEOPLASM OF CERVIX: ICD-10-CM

## 2022-10-27 DIAGNOSIS — Z87.891 ENCOUNTER FOR SCREENING FOR MALIGNANT NEOPLASM OF LUNG IN FORMER SMOKER WHO QUIT IN PAST 15 YEARS WITH 30 PACK YEAR HISTORY OR GREATER: ICD-10-CM

## 2022-10-27 DIAGNOSIS — Z12.2 ENCOUNTER FOR SCREENING FOR MALIGNANT NEOPLASM OF LUNG IN FORMER SMOKER WHO QUIT IN PAST 15 YEARS WITH 30 PACK YEAR HISTORY OR GREATER: ICD-10-CM

## 2022-10-27 DIAGNOSIS — Z12.11 SCREENING FOR MALIGNANT NEOPLASM OF COLON: ICD-10-CM

## 2022-10-27 DIAGNOSIS — R00.2 PALPITATIONS: ICD-10-CM

## 2022-10-27 DIAGNOSIS — Z12.31 ENCOUNTER FOR SCREENING MAMMOGRAM FOR BREAST CANCER: ICD-10-CM

## 2022-10-27 DIAGNOSIS — Z86.79 HX OF LEFT BUNDLE BRANCH BLOCK: ICD-10-CM

## 2022-10-27 PROCEDURE — G0123 SCREEN CERV/VAG THIN LAYER: HCPCS | Mod: ZL | Performed by: STUDENT IN AN ORGANIZED HEALTH CARE EDUCATION/TRAINING PROGRAM

## 2022-10-27 PROCEDURE — 99213 OFFICE O/P EST LOW 20 MIN: CPT | Mod: 25 | Performed by: STUDENT IN AN ORGANIZED HEALTH CARE EDUCATION/TRAINING PROGRAM

## 2022-10-27 PROCEDURE — 99396 PREV VISIT EST AGE 40-64: CPT | Performed by: STUDENT IN AN ORGANIZED HEALTH CARE EDUCATION/TRAINING PROGRAM

## 2022-10-27 PROCEDURE — 87624 HPV HI-RISK TYP POOLED RSLT: CPT | Mod: ZL | Performed by: STUDENT IN AN ORGANIZED HEALTH CARE EDUCATION/TRAINING PROGRAM

## 2022-10-27 PROCEDURE — G0463 HOSPITAL OUTPT CLINIC VISIT: HCPCS | Mod: 25

## 2022-10-27 ASSESSMENT — ENCOUNTER SYMPTOMS
PARESTHESIAS: 1
WEAKNESS: 1
DYSURIA: 0
HEARTBURN: 1
HEADACHES: 1
NERVOUS/ANXIOUS: 1
DIZZINESS: 1
COUGH: 0
MYALGIAS: 0
ABDOMINAL PAIN: 0
JOINT SWELLING: 0
DIARRHEA: 0
HEMATURIA: 0
CONSTIPATION: 1
FEVER: 0
EYE PAIN: 1
HEMATOCHEZIA: 0
ARTHRALGIAS: 1
FREQUENCY: 0
PALPITATIONS: 1
NAUSEA: 0
BREAST MASS: 0
SHORTNESS OF BREATH: 0
SORE THROAT: 0
CHILLS: 0

## 2022-10-27 ASSESSMENT — ANXIETY QUESTIONNAIRES
6. BECOMING EASILY ANNOYED OR IRRITABLE: MORE THAN HALF THE DAYS
3. WORRYING TOO MUCH ABOUT DIFFERENT THINGS: MORE THAN HALF THE DAYS
GAD7 TOTAL SCORE: 13
1. FEELING NERVOUS, ANXIOUS, OR ON EDGE: MORE THAN HALF THE DAYS
4. TROUBLE RELAXING: MORE THAN HALF THE DAYS
5. BEING SO RESTLESS THAT IT IS HARD TO SIT STILL: SEVERAL DAYS
7. FEELING AFRAID AS IF SOMETHING AWFUL MIGHT HAPPEN: MORE THAN HALF THE DAYS
2. NOT BEING ABLE TO STOP OR CONTROL WORRYING: MORE THAN HALF THE DAYS
GAD7 TOTAL SCORE: 13

## 2022-10-27 ASSESSMENT — PATIENT HEALTH QUESTIONNAIRE - PHQ9
SUM OF ALL RESPONSES TO PHQ QUESTIONS 1-9: 13
SUM OF ALL RESPONSES TO PHQ QUESTIONS 1-9: 13
10. IF YOU CHECKED OFF ANY PROBLEMS, HOW DIFFICULT HAVE THESE PROBLEMS MADE IT FOR YOU TO DO YOUR WORK, TAKE CARE OF THINGS AT HOME, OR GET ALONG WITH OTHER PEOPLE: NOT DIFFICULT AT ALL

## 2022-10-27 ASSESSMENT — PAIN SCALES - GENERAL: PAINLEVEL: NO PAIN (0)

## 2022-10-27 NOTE — NURSING NOTE
"Chief Complaint   Patient presents with     Physical       Initial /76 (BP Location: Right arm, Patient Position: Chair, Cuff Size: Adult Regular)   Pulse 85   Temp 97.5  F (36.4  C)   Ht 1.626 m (5' 4\")   Wt 117.9 kg (260 lb)   SpO2 98%   BMI 44.63 kg/m   Estimated body mass index is 44.63 kg/m  as calculated from the following:    Height as of this encounter: 1.626 m (5' 4\").    Weight as of this encounter: 117.9 kg (260 lb).  Medication Reconciliation: complete  Erma Colón RN  "

## 2022-10-27 NOTE — LETTER
My Depression Action Plan  Name: Gabbie Ndiaye   Date of Birth 1965  Date: 10/27/2022    My doctor: Arya Cee   My clinic: [facility] - [place]  [address]  [place] [address]  [phone]          GREEN    ZONE   Good Control    What it looks like:     Things are going generally well. You have normal ups and downs. You may even feel depressed from time to time, but bad moods usually last less than a day.   What you need to do:  1. Continue to care for yourself (see self care plan)  2. Check your depression survival kit and update it as needed  3. Follow your physician s recommendations including any medication.  4. Do not stop taking medication unless you consult with your physician first.           YELLOW         ZONE Getting Worse    What it looks like:     Depression is starting to interfere with your life.     It may be hard to get out of bed; you may be starting to isolate yourself from others.    Symptoms of depression are starting to last most all day and this has happened for several days.     You may have suicidal thoughts but they are not constant.   What you need to do:     1. Call your care team. Your response to treatment will improve if you keep your care team informed of your progress. Yellow periods are signs an adjustment may need to be made.     2. Continue your self-care.  Just get dressed and ready for the day.  Don't give yourself time to talk yourself out of it.    3. Talk to someone in your support network.    4. Open up your Depression Self-Care Plan/Wellness Kit.           RED    ZONE Medical Alert - Get Help    What it looks like:     Depression is seriously interfering with your life.     You may experience these or other symptoms: You can t get out of bed most days, can t work or engage in other necessary activities, you have trouble taking care of basic hygiene, or basic responsibilities, thoughts of suicide or death that will not go away,  self-injurious behavior.     What you need to do:  1. Call your care team and request a same-day appointment. If they are not available (weekends or after hours) call your local crisis line, emergency room or 911.          Depression Self-Care Plan / Wellness Kit    Many people find that medication and therapy are helpful treatments for managing depression. In addition, making small changes to your everyday life can help to boost your mood and improve your wellbeing. Below are some tips for you to consider. Be sure to talk with your medical provider and/or behavioral health consultant if your symptoms are worsening or not improving.     Sleep   Sleep hygiene  means all of the habits that support good, restful sleep. It includes maintaining a consistent bedtime and wake time, using your bedroom only for sleeping or sex, and keeping the bedroom dark and free of distractions like a computer, smartphone, or television.     Develop a Healthy Routine  Maintain good hygiene. Get out of bed in the morning, make your bed, brush your teeth, take a shower, and get dressed. Don t spend too much time viewing media that makes you feel stressed. Find time to relax each day.    Exercise  Get some form of exercise every day. This will help reduce pain and release endorphins, the  feel good  chemicals in your brain. It can be as simple as just going for a walk or doing some gardening, anything that will get you moving.      Diet  Strive to eat healthy foods, including fruits and vegetables. Drink plenty of water. Avoid excessive sugar, caffeine, alcohol, and other mood-altering substances.     Stay Connected with Others  Stay in touch with friends and family members.    Manage Your Mood  Try deep breathing, massage therapy, biofeedback, or meditation. Take part in fun activities when you can. Try to find something to smile about each day.     Psychotherapy  Be open to working with a therapist if your provider recommends it.      Medication  Be sure to take your medication as prescribed. Most anti-depressants need to be taken every day. It usually takes several weeks for medications to work. Not all medicines work for all people. It is important to follow-up with your provider to make sure you have a treatment plan that is working for you. Do not stop your medication abruptly without first discussing it with your provider.    Crisis Resources   These hotlines are for both adults and children. They and are open 24 hours a day, 7 days a week unless noted otherwise.      National Suicide Prevention Lifeline   988 or 1-138-168-KXYY (7506)      Crisis Text Line    www.crisistextline.org  Text HOME to 367536 from anywhere in the [place], anytime, about any type of crisis. A live, trained crisis counselor will receive the text and respond quickly.      [place] Lifeline for LGBTQ Youth  A national crisis intervention and suicide lifeline for LGBTQ youth under 25. Provides a safe place to talk without judgement. Call 1-497.898.7549; text START to 485722 or visit www.thetrevorproject.org to talk to a trained counselor.      For [facility] crisis numbers, visit the [facility] website at:  https://[place].gov/dhs/people-we-serve/adults/health-care/mental-health/resources/crisis-contacts.jsp

## 2022-10-28 ENCOUNTER — TELEPHONE (OUTPATIENT)
Dept: FAMILY MEDICINE | Facility: OTHER | Age: 57
End: 2022-10-28

## 2022-10-28 DIAGNOSIS — E66.01 MORBID OBESITY (H): ICD-10-CM

## 2022-10-28 RX ORDER — METFORMIN HCL 500 MG
TABLET, EXTENDED RELEASE 24 HR ORAL
Qty: 113 TABLET | Refills: 0 | Status: SHIPPED | OUTPATIENT
Start: 2022-10-28 | End: 2023-01-16

## 2022-10-28 NOTE — TELEPHONE ENCOUNTER
See below, PA denied. Please advise     JUDY ALY LPN     Protopic Counseling: Patient may experience a mild burning sensation during topical application. Protopic is not approved in children less than 2 years of age. There have been case reports of hematologic and skin malignancies in patients using topical calcineurin inhibitors although causality is questionable.

## 2022-10-28 NOTE — TELEPHONE ENCOUNTER
PRIOR AUTHORIZATION DENIED    Medication: semaglutide (OZEMPIC) 2 MG/1.5ML SOPN pen - EPA DENIED    Denial Date: 10/27/2022    Denial Rational:       Appeal Information:

## 2022-10-28 NOTE — TELEPHONE ENCOUNTER
1. BMI 40.0-44.9, adult (H)  2. Morbid obesity (H)  - metFORMIN (GLUCOPHAGE XR) 500 MG 24 hr tablet; Take 1 tablet (500 mg) by mouth daily (with dinner) for 7 days, THEN 1 tablet (500 mg) 2 times daily (with meals) for 53 days.  Dispense: 113 tablet; Refill: 0    Arya Cee MD  [facility] - [place]

## 2022-11-02 LAB
BKR LAB AP GYN ADEQUACY: NORMAL
BKR LAB AP GYN INTERPRETATION: NORMAL
BKR LAB AP HPV REFLEX: NORMAL
BKR LAB AP PREVIOUS ABNORMAL: NORMAL
PATH REPORT.COMMENTS IMP SPEC: NORMAL
PATH REPORT.COMMENTS IMP SPEC: NORMAL
PATH REPORT.RELEVANT HX SPEC: NORMAL

## 2022-11-04 LAB
HUMAN PAPILLOMA VIRUS 16 DNA: NEGATIVE
HUMAN PAPILLOMA VIRUS 18 DNA: NEGATIVE
HUMAN PAPILLOMA VIRUS FINAL DIAGNOSIS: NORMAL
HUMAN PAPILLOMA VIRUS OTHER HR: NEGATIVE

## 2022-12-16 ENCOUNTER — HOSPITAL ENCOUNTER (OUTPATIENT)
Dept: CT IMAGING | Facility: HOSPITAL | Age: 57
Discharge: HOME | End: 2022-12-16
Attending: STUDENT IN AN ORGANIZED HEALTH CARE EDUCATION/TRAINING PROGRAM
Payer: COMMERCIAL

## 2022-12-16 ENCOUNTER — HOSPITAL ENCOUNTER (OUTPATIENT)
Dept: CARDIOLOGY | Facility: HOSPITAL | Age: 57
Discharge: HOME | End: 2022-12-16
Attending: STUDENT IN AN ORGANIZED HEALTH CARE EDUCATION/TRAINING PROGRAM
Payer: COMMERCIAL

## 2022-12-16 ENCOUNTER — ANCILLARY PROCEDURE (OUTPATIENT)
Dept: MAMMOGRAPHY | Facility: OTHER | Age: 57
End: 2022-12-16
Attending: STUDENT IN AN ORGANIZED HEALTH CARE EDUCATION/TRAINING PROGRAM
Payer: COMMERCIAL

## 2022-12-16 DIAGNOSIS — R00.2 PALPITATIONS: ICD-10-CM

## 2022-12-16 DIAGNOSIS — Z12.31 ENCOUNTER FOR SCREENING MAMMOGRAM FOR BREAST CANCER: ICD-10-CM

## 2022-12-16 DIAGNOSIS — Z12.2 ENCOUNTER FOR SCREENING FOR MALIGNANT NEOPLASM OF LUNG IN FORMER SMOKER WHO QUIT IN PAST 15 YEARS WITH 30 PACK YEAR HISTORY OR GREATER: ICD-10-CM

## 2022-12-16 DIAGNOSIS — Z87.891 ENCOUNTER FOR SCREENING FOR MALIGNANT NEOPLASM OF LUNG IN FORMER SMOKER WHO QUIT IN PAST 15 YEARS WITH 30 PACK YEAR HISTORY OR GREATER: ICD-10-CM

## 2022-12-16 LAB — LVEF ECHO: NORMAL

## 2022-12-16 PROCEDURE — 93244 EXT ECG>48HR<7D REV&INTERPJ: CPT | Performed by: INTERNAL MEDICINE

## 2022-12-16 PROCEDURE — 77067 SCR MAMMO BI INCL CAD: CPT | Mod: TC

## 2022-12-16 PROCEDURE — 93242 EXT ECG>48HR<7D RECORDING: CPT

## 2022-12-16 PROCEDURE — 93306 TTE W/DOPPLER COMPLETE: CPT

## 2022-12-16 PROCEDURE — 93306 TTE W/DOPPLER COMPLETE: CPT | Mod: 26 | Performed by: INTERNAL MEDICINE

## 2022-12-16 PROCEDURE — 71271 CT THORAX LUNG CANCER SCR C-: CPT

## 2022-12-16 NOTE — PROGRESS NOTES
Zio patch placed on patient in outpatient appointment today. Patient was instructed to wear patch for 3 days. Skin was prepped and patch was placed following [employer] guidelines .     Patient was instructed to push button when symptomatic and fill out diary. Patient was instructed not to submerse in water and no swimming, saunas, or hot tubs. Showers may be taken keeping back towards the water. If the area DOES become wet pat it dry with a cloth. If skin irritation occurs patient was instructed to remove patch and contact [employer].    Patient understands we do not receive results until they mail patch back inside box. Patient also instructed to contact [employer] with any questions [phone].    Patient had no further questions and agreed with plan. Patient was sent home with the box, information pamphlet and booklet to mukesh any incidents in.

## 2022-12-19 ENCOUNTER — TELEPHONE (OUTPATIENT)
Dept: FAMILY MEDICINE | Facility: OTHER | Age: 57
End: 2022-12-19

## 2022-12-19 DIAGNOSIS — I50.22 CHRONIC HFREF (HEART FAILURE WITH REDUCED EJECTION FRACTION) (H): Primary | ICD-10-CM

## 2022-12-19 NOTE — TELEPHONE ENCOUNTER
Attempted to call patient to review recent echocardiogram results; no answer and no voicemail set up.    Interpretation Summary  Moderate to severe left ventricular dilation is present.  Left ventricular function is decreased. The ejection fraction is 25-30%  (severely reduced).  Mild left atrial enlargement is present.  Mild mitral insufficiency is present.  Trace aortic insufficiency is present.  No aortic stenosis is present.  Mild tricuspid insufficiency is present.  Right ventricular systolic pressure is 25mmHg above the right atrial pressure.    -Recommend cardiology referral, as well as further work-up with potentially cardiac angiogram versus stress test.  -Will try calling again this afternoon.  -Does have upcoming appointment 12/30/2022.    Arya Cee MD  [facility]

## 2022-12-19 NOTE — TELEPHONE ENCOUNTER
Attempted to call patient review recent echocardiogram sounds; no answer no voicemail set up.    Will continue to reach out to patient but also send results letter.    Recommend cardiology referral as well as coronary angio versus stress test.    Arya Cee MD  [facility]

## 2022-12-21 ENCOUNTER — MYC MEDICAL ADVICE (OUTPATIENT)
Dept: FAMILY MEDICINE | Facility: OTHER | Age: 57
End: 2022-12-21

## 2022-12-21 ENCOUNTER — TELEPHONE (OUTPATIENT)
Dept: MAMMOGRAPHY | Facility: OTHER | Age: 57
End: 2022-12-21

## 2023-01-15 ENCOUNTER — HEALTH MAINTENANCE LETTER (OUTPATIENT)
Age: 58
End: 2023-01-15

## 2023-01-16 ENCOUNTER — TELEPHONE (OUTPATIENT)
Dept: FAMILY MEDICINE | Facility: OTHER | Age: 58
End: 2023-01-16

## 2023-01-16 ENCOUNTER — OFFICE VISIT (OUTPATIENT)
Dept: FAMILY MEDICINE | Facility: OTHER | Age: 58
End: 2023-01-16
Attending: STUDENT IN AN ORGANIZED HEALTH CARE EDUCATION/TRAINING PROGRAM
Payer: COMMERCIAL

## 2023-01-16 VITALS
SYSTOLIC BLOOD PRESSURE: 112 MMHG | OXYGEN SATURATION: 98 % | HEIGHT: 64 IN | DIASTOLIC BLOOD PRESSURE: 68 MMHG | TEMPERATURE: 98 F | BODY MASS INDEX: 43.36 KG/M2 | HEART RATE: 81 BPM | WEIGHT: 254 LBS

## 2023-01-16 DIAGNOSIS — Z86.79 HX OF LEFT BUNDLE BRANCH BLOCK: ICD-10-CM

## 2023-01-16 DIAGNOSIS — E66.01 MORBID OBESITY (H): ICD-10-CM

## 2023-01-16 DIAGNOSIS — I50.20 HFREF (HEART FAILURE WITH REDUCED EJECTION FRACTION) (H): Primary | ICD-10-CM

## 2023-01-16 DIAGNOSIS — R00.2 PALPITATIONS: ICD-10-CM

## 2023-01-16 PROCEDURE — 99214 OFFICE O/P EST MOD 30 MIN: CPT | Performed by: STUDENT IN AN ORGANIZED HEALTH CARE EDUCATION/TRAINING PROGRAM

## 2023-01-16 PROCEDURE — G0463 HOSPITAL OUTPT CLINIC VISIT: HCPCS

## 2023-01-16 RX ORDER — METFORMIN HCL 500 MG
500 TABLET, EXTENDED RELEASE 24 HR ORAL 2 TIMES DAILY WITH MEALS
Qty: 180 TABLET | Refills: 0 | Status: SHIPPED | OUTPATIENT
Start: 2023-01-16 | End: 2024-05-20

## 2023-01-16 ASSESSMENT — PAIN SCALES - GENERAL: PAINLEVEL: NO PAIN (0)

## 2023-01-16 ASSESSMENT — PATIENT HEALTH QUESTIONNAIRE - PHQ9: SUM OF ALL RESPONSES TO PHQ QUESTIONS 1-9: 1

## 2023-01-16 NOTE — PROGRESS NOTES
Assessment & Plan     HFrEF (heart failure with reduced ejection fraction) (H)  Hx of left bundle branch block  Palpitations  Recent diagnosis of HFrEF although duration and etiology unclear, no acute change in symptoms and endorses ongoing intermittent palpitations for the past several years.  Also 16/22 echo notable for EF 25-30%, LV dilation, mild valvular disease.  Long discussion of physiology as well as further work-up and management.  Does have some areas of hypokinesis so certainly need to continue ischemic eval.  Morbid obesity but otherwise no obvious risk factors.  Proceed with work-up with repeat cardiology referral.  Shared decision making to hold off on pharmacologic management at this time but discussed anticipated need for beta-blocker, ACE/ARB, possible diuretic.  - Adult Sleep Eval & Management  Referral; Future  - Adult Cardiology Eval  Referral  - NM Lexiscan stress test; Future    Morbid obesity (H)  Tolerating well, 6 lb loss since starting, requests refill. A1c 5.6 9/2022.  - metFORMIN (GLUCOPHAGE XR) 500 MG 24 hr tablet; Take 1 tablet (500 mg) by mouth 2 times daily (with meals) for 90 days    38 minutes spent on the date of the encounter doing chart review, history and exam, documentation and further activities per the note       Return in about 3 months (around 4/16/2023) for f/u heart workup.    Arya Cee MD  [facility] KAVITA Cartwright is a 57 year old, presenting for the following health issues:  Follow Up      HPI     Concern - follow up from physical  Onset: 3 months  Description: had lab work and pap done  Intensity: mild  Progression of Symptoms:  same  Accompanying Signs & Symptoms: none  Previous history of similar problem: n/a  Precipitating factors:        Worsened by: n/a  Alleviating factors:        Improved by: n/a  Therapies tried and outcome: needs refill on metformin    -Here for follow-up test results from annual  "physical 10/27/2022  -All results reviewed, majority of time spent on cardiac work-up    -Had been seen for palpitations in the ED last summer, work-up continued with fairly unremarkable Holter monitor last fall  -Echo completed 12/16/2022; somewhat unexpected results with reduced EF of 25 to 30% with moderate to severe LV dilation, mild valvular insufficiency    -Symptomatically has had no change since last summer during the first episode of palpitations, still occasionally has very brief palpitations only lasting a few beats long  -No associated headache, dizziness, lightheadedness, shortness of breath  -Endorses chronic mild peripheral edema, very manageable with supportive treatment including Jan socks, leg compression  -Able to lay flat with sleeping, does endorse snoring, unsure about apnea  -Has reportedly had intermittent palpitations for several years  -Very hesitant to do any type strenuous activity of concern for stressing her heart    -No personal or family history of heart disease    -Optimistic about being able to lose 6 pounds over the past 3 months, metformin added at that time    Review of Systems   Constitutional, HEENT, cardiovascular, pulmonary, gi and gu systems are negative, except as otherwise noted.      Objective    /68   Pulse 81   Temp 98  F (36.7  C)   Ht 1.626 m (5' 4\")   Wt 115.2 kg (254 lb)   SpO2 98%   BMI 43.60 kg/m    Body mass index is 43.6 kg/m .  Physical Exam  Vitals reviewed.   Constitutional:       General: She is not in acute distress.     Appearance: Normal appearance. She is not ill-appearing or toxic-appearing.   HENT:      Head: Normocephalic and atraumatic.   Cardiovascular:      Rate and Rhythm: Normal rate and regular rhythm.      Pulses: Normal pulses.      Heart sounds: Normal heart sounds. No murmur heard.     Comments: Trace peripheral edema bilaterally to mid shin  Pulmonary:      Effort: Pulmonary effort is normal. No respiratory distress.      Breath " sounds: Normal breath sounds. No stridor. No wheezing or rhonchi.   Abdominal:      General: Abdomen is flat.      Palpations: Abdomen is soft.   Musculoskeletal:         General: Normal range of motion.   Skin:     General: Skin is warm and dry.   Neurological:      General: No focal deficit present.      Mental Status: She is alert and oriented to person, place, and time.   Psychiatric:         Mood and Affect: Mood normal.         Behavior: Behavior normal.

## 2023-01-17 ENCOUNTER — TELEPHONE (OUTPATIENT)
Dept: CARDIOLOGY | Facility: OTHER | Age: 58
End: 2023-01-17

## 2023-01-17 NOTE — TELEPHONE ENCOUNTER
Called patient to schedule with cardiology from referral. The only number we have for the patient is invalid/not in service.

## 2023-02-27 ENCOUNTER — PATIENT OUTREACH (OUTPATIENT)
Dept: CARE COORDINATION | Facility: OTHER | Age: 58
End: 2023-02-27

## 2023-02-27 ENCOUNTER — OFFICE VISIT (OUTPATIENT)
Dept: CARDIOLOGY | Facility: OTHER | Age: 58
End: 2023-02-27
Attending: STUDENT IN AN ORGANIZED HEALTH CARE EDUCATION/TRAINING PROGRAM
Payer: COMMERCIAL

## 2023-02-27 VITALS
DIASTOLIC BLOOD PRESSURE: 62 MMHG | OXYGEN SATURATION: 98 % | RESPIRATION RATE: 15 BRPM | SYSTOLIC BLOOD PRESSURE: 112 MMHG | TEMPERATURE: 97.6 F | BODY MASS INDEX: 43.41 KG/M2 | HEART RATE: 74 BPM | WEIGHT: 252.9 LBS

## 2023-02-27 DIAGNOSIS — I50.20 HFREF (HEART FAILURE WITH REDUCED EJECTION FRACTION) (H): ICD-10-CM

## 2023-02-27 DIAGNOSIS — K21.00 GASTROESOPHAGEAL REFLUX DISEASE WITH ESOPHAGITIS WITHOUT HEMORRHAGE: ICD-10-CM

## 2023-02-27 DIAGNOSIS — I25.10 CORONARY ARTERY DISEASE INVOLVING NATIVE CORONARY ARTERY OF NATIVE HEART WITHOUT ANGINA PECTORIS: ICD-10-CM

## 2023-02-27 DIAGNOSIS — I44.7 LEFT BUNDLE BRANCH BLOCK (LBBB): ICD-10-CM

## 2023-02-27 DIAGNOSIS — R00.2 PALPITATIONS: Primary | ICD-10-CM

## 2023-02-27 DIAGNOSIS — E66.01 MORBID OBESITY (H): ICD-10-CM

## 2023-02-27 DIAGNOSIS — I83.90 ASYMPTOMATIC VARICOSE VEINS: ICD-10-CM

## 2023-02-27 DIAGNOSIS — R93.1 REGIONAL WALL MOTION ABNORMALITY OF HEART: ICD-10-CM

## 2023-02-27 PROCEDURE — 99205 OFFICE O/P NEW HI 60 MIN: CPT | Mod: 25 | Performed by: INTERNAL MEDICINE

## 2023-02-27 PROCEDURE — 93010 ELECTROCARDIOGRAM REPORT: CPT | Performed by: INTERNAL MEDICINE

## 2023-02-27 RX ORDER — ASPIRIN 81 MG/1
81 TABLET, CHEWABLE ORAL DAILY
Qty: 90 TABLET | Refills: 3 | Status: SHIPPED | OUTPATIENT
Start: 2023-02-27

## 2023-02-27 RX ORDER — METOPROLOL SUCCINATE 25 MG/1
25 TABLET, EXTENDED RELEASE ORAL DAILY
Qty: 90 TABLET | Refills: 3 | Status: SHIPPED | OUTPATIENT
Start: 2023-02-27 | End: 2024-02-15

## 2023-02-27 RX ORDER — SACUBITRIL AND VALSARTAN 24; 26 MG/1; MG/1
1 TABLET, FILM COATED ORAL 2 TIMES DAILY
Qty: 90 TABLET | Refills: 3 | Status: SHIPPED | OUTPATIENT
Start: 2023-02-27 | End: 2023-04-03 | Stop reason: DRUGHIGH

## 2023-02-27 ASSESSMENT — ACTIVITIES OF DAILY LIVING (ADL): DEPENDENT_IADLS:: INDEPENDENT

## 2023-02-27 ASSESSMENT — PAIN SCALES - GENERAL: PAINLEVEL: NO PAIN (0)

## 2023-02-27 NOTE — PROGRESS NOTES
"[facility]   CARDIOLOGY CONSULT     Gabbie Ndiaye   1965  [id number]    Arya Cee     Chief Complaint   Patient presents with     New Patient     Palpitations  HFrEF          HPI:   Mrs. Ndiaye is a 57-year-old female who is being seen by cardiology for new onset heart failure.  She has been having palpitations since 2015.  Believes she had a \"electrical event in 2017 or 2018\" has been following up with her primary care physician for palpitations.  Echocardiogram and Zio patch obtained.  Patient noted to have reduced EF at 25-30%.  This was a new diagnosis.  She was referred to cardiology as a result.    Gabbie states that she has been having palpitations since 2015.  She has occasional fluttering in her chest.  She had seen a provider in 2017 or 2018 and was told she had \"an electrical event\".  Since that time, she has battled with palpitations.  She is followed up with the clinic and and underwent Zio patch and echocardiogram.  She is known to have a left bundle branch block but no history of heart disease or stenting/bypass.    She underwent an echocardiogram on 12/16/2022.  EF was 25-30% with wall motion abnormalities.  She has been having heartburn which she has had since high school years.  She has had to take more Pepcid but seems to be improved with this medication.  She has not noticed any tightness, pressure, arm, neck, or intrascapular discomfort with activity such as walking up and down stairs in her house.  She has not had significant swelling or peripheral edema.    She has had some duskiness to feet, worse on the left versus right.  She has varicose veins on the left without any wounds or gangrene.  It was explained to her that this could be surgically corrected but may be considered a cosmetic procedure as she does not have any concerning effects from this problem.  She was told that if this progresses, it could cause gangrene or unhealed ulcers which would need to be " treated.    As far as palpitations.  She had a Zio patch completed on 12/16/2022.  It showed left bundle branch block with SVE, VE, x3 runs of SVT lasting up to 11 beats.  No evidence of A-fib, pauses, heart block, or atrial fibrillation.    IMAGING RESULTS:   ECHO on 12/16/22:  Moderate to severe left ventricular dilation is present.  Left ventricular function is decreased. The ejection fraction is 25-30% (severely reduced).  Mild left atrial enlargement is present.  Mild mitral insufficiency is present.  Trace aortic insufficiency is present.  No aortic stenosis is present.  Mild tricuspid insufficiency is present.  Right ventricular systolic pressure is 25mmHg above the right atrial pressure.    Zio patch on 12/16/22:  Worn for 2 days and 19 hr's.  After removing artifact, total time was 2 days and 14 hr's. Placed on 12/16/22 at 12:05 pm and completed on 12/19/22 at 6:52 am.  Underlying rhythm was sinus with BBB pattern.  Hrt rate ranged from 49 bpm, maximum heart rate of 184 bmp, averaging 76 bmp.  No significant bradycardia, pauses, Mobitz type II or 3rd degree heart block.  No atrial fibrillation on this study.  x7 triggered events and x0 diary entries.  These corresponded to SVT, NSR, SVEs and VEs.  x1 runs of VT lasting 5 beats with a maximum heart rate of 167 bmp.    x3 runs of SVT longest lasting 11 beats with a maximum heart rate of 184 bmp.  Rare, <1% of PAC's, atrial couplets, atrial triplets, and PVC's.  No episodes of ventricular bigeminy.  No episodes of ventricular trigeminy.      CURRENT MEDICATIONS:   Prior to Admission medications    Medication Sig Start Date End Date Taking? Authorizing Provider   acetaminophen (TYLENOL) 500 MG tablet Take 500-1,000 mg by mouth every 6 hours as needed for mild pain    Reported, Patient   FP VITAMIN E BLENDED OR     Reported, Patient   metFORMIN (GLUCOPHAGE XR) 500 MG 24 hr tablet Take 1 tablet (500 mg) by mouth 2 times daily (with meals) for 90 days 1/16/23  4/16/23  Arya Cee MD   POTASSIUM CHLORIDE PO     Reported, Patient       ALLERGIES:   Allergies   Allergen Reactions     Nsaids Hives        PAST MEDICAL HISTORY:   Past Medical History:   Diagnosis Date     Anxiety      Depressive disorder      Gastroesophageal reflux disease      Tobacco use disorder 9/13/2005    Formatting of this note might be different from the original. Ongoing, smokes 1ppd for 20 years (20pack year hx.)        PAST SURGICAL HISTORY:   Past Surgical History:   Procedure Laterality Date     APPENDECTOMY  2004     AS REMOVAL GALLBLADDER  1988     C/SECTION, CLASSICAL  1998        FAMILY HISTORY:   No family history on file.     SOCIAL HISTORY:   Social History     Socioeconomic History     Marital status:    Tobacco Use     Smoking status: Former   Vaping Use     Vaping Use: Never used   Substance and Sexual Activity     Alcohol use: Yes     Comment: occassionally 1 beer     Drug use: Never     Sexual activity: Not Currently          ROS:   CONSTITUTIONAL: No weight loss, fever, chills, weakness or fatigue.   CARDIOVASCULAR: No chest pain, chest pressure or chest discomfort. No palpitations or lower extremity edema.   RESPIRATORY: No shortness of breath, dyspnea upon exertion, cough or sputum production.   NEUROLOGICAL: No headache, lightheadedness, dizziness, syncope, ataxia or weakness.   HEMATOLOGIC: No anemia, bleeding or bruising.         PHYSICAL EXAM:   GENERAL: The patient is a well-developed, well-nourished, in no apparent distress. Alert and oriented x3.   HEART: Regular rate and rhythm, S1S2 present without murmur, rub or gallop.   LUNGS: Respirations regular and unlabored. Clear to auscultation.   EXTREMITIES: No peripheral edema present.   SKIN: No jaundice. No rashes or visible skin lesions present.        LAB RESULTS:   No visits with results within 2 Month(s) from this visit.   Latest known visit with results is:   Office Visit on 10/27/2022   Component Date Value  Ref Range Status     Interpretation 10/27/2022 Negative for Intraepithelial Lesion or Malignancy (NILM)    Final     Comment 10/27/2022    Final                    Value:This result contains rich text formatting which cannot be displayed here.     Specimen Adequacy 10/27/2022 Satisfactory for evaluation, endocervical/transformation zone component present   Final     Clinical Information 10/27/2022    Final                    Value:This result contains rich text formatting which cannot be displayed here.     Reflex Testing 10/27/2022 Yes regardless of result   Final     Previous Abnormal? 10/27/2022    Final                    Value:This result contains rich text formatting which cannot be displayed here.     Performing Labs 10/27/2022    Final                    Value:This result contains rich text formatting which cannot be displayed here.     Other HR HPV 10/27/2022 Negative  Negative Final     HPV16 DNA 10/27/2022 Negative  Negative Final     HPV18 DNA 10/27/2022 Negative  Negative Final     FINAL DIAGNOSIS 10/27/2022    Final                    Value:This result contains rich text formatting which cannot be displayed here.          ASSESSMENT:       ICD-10-CM    1. Palpitations  R00.2 EKG 12-lead complete w/read - Clinics     EKG 12-lead complete w/read - Clinics      2. HFrEF (heart failure with reduced ejection fraction) (H)  I50.20 metoprolol succinate ER (TOPROL XL) 25 MG 24 hr tablet     sacubitril-valsartan (ENTRESTO) 24-26 MG per tablet     empagliflozin (JARDIANCE) 10 MG TABS tablet     Cardiac Rehab Referral      3. Regional wall motion abnormality of heart  R93.1 aspirin (ASA) 81 MG chewable tablet      4. Gastroesophageal reflux disease with esophagitis without hemorrhage  K21.00       5. Morbid obesity (H)  E66.01       6. BMI 40.0-44.9, adult (H)  Z68.41       7. Coronary artery disease involving native coronary artery of native heart without angina pectoris  I25.10 aspirin (ASA) 81 MG chewable  tablet      8. Asymptomatic varicose veins  I83.90       9. Left bundle branch block (LBBB)  I44.7             PLAN:   1.  CHF: Systolic in nature.  Potentially related to weight, idiopathic, COVID-19 and June 2022, or familial.  Suggest that she start on metoprolol 25 mg XL daily, Entresto 24/26 mg twice a day, and Jardiance 10 mg daily.  In addition, she will refer to cardiac rehab related to her heart failure.  She need a ICD if her heart function remains less than 35% on maximally telemetry guideline directed treatment after 3 months and she is not found to have ischemia as a cause.  She we will also have a stress test to rule out ischemia as a cause as she did have wall motion abnormalities on her echocardiogram.  We did discuss salt restriction, fluid restriction, and daily weights.  She will be set up with the heart failure clinic.  2.  Concern for CAD: She had wall motion abnormalities with a reduced EF with progressive and increasing acid reflux.  Her acid reflux is controlled with increasing medications.  She was told to take 81 mg of aspirin daily.  She has been able to take this and continues to take it sporadically even though she has NSAIDs listed as an allergy.  If found to have abnormalities, will refer to the [facility] for possible cardiac catheterization.  3.  LBBB: Potentially related to heart failure/CAD  4.  Varicose veins: She understands that this can be treated but may be considered cosmetic at this point.  If she starts to have nausea healing ulcers or other issues related to it, this should be more aggressively treated.  She does wear compression stockings.  4.  Follow-up in 1 to 2 months to review her stress test and assess tolerance of medications.  If stress test is abnormal, will refer to [facility] as soon as this abnormal test is identified.    Total time spent on day of visit, including review of tests, obtaining/reviewing separately obtained history,  ordering medications/tests/procedures, communicating with PCP/consultants, and documenting in electronic medical record: 68 minutes.       Thank you for allowing me to participate in the care of your patient. Please do not hesitate to contact me if you have any questions.     Memo Roberts, DO

## 2023-02-27 NOTE — PATIENT INSTRUCTIONS
Thank you for allowing Dr. Roberts and our  team to participate in your care. Please call our office at [phone] with scheduling questions or if you need to cancel or change your appointment. With any other questions or concerns you may call [facility] cardiology nurse at [phone].       If you experience chest pain, chest pressure, chest tightness, shortness of breath, fainting, lightheadedness, nausea, vomiting, or other concerning symptoms, please report to the Emergency Department or call 911. These symptoms may be emergent, and best treated in the Emergency Department.    Follow up in 1-2 months.     Continue with your stress test.     Jardiance 10 mg daily.     Entresto 24-26 mg twice daily.     Metoprolol succinate 50 mg daily.     Cardiac rehab referral. You will be called to set this up.     Asprin 81 mg daily.     Limit your salt intake to no more than 2500 mg each day.  Salt is a magnet for water, so the more salt you have in your diet, the more your body is going to retain fluid.  Things like canned and boxed foods, fast food, deli meats, potato chips, and processed cheeses are very high in sodium.    Limit the amount of fluids you take in to no more than 2 liters per day.  This is the equivalent of eight, 8 ounce glasses of fluids per day.  Fluids includes everything such as coffee, water, juice, milk, pop, and beer.  The more fluid you drink each day, the more your body is going to retain fluid.    Please monitor you weight daily.  If you experience a 2-3 pound increase in weight in  24 hours, or 5 pounds in one week. Please call the cardiology office as you may need your medications adjusted or you may need to be seen.      Systolic heart failure. If you have systolic heart failure, the left ventricle of your heart, which pumps most of the blood, has become weak. This may happen because it's gotten bigger. Since it's larger, the ventricle can't contract the way it should. Because of that, your  heart doesn't pump with enough force to push blood throughout your body. With this heart failure your ejection fraction (measurement of how much your heart pumps out with each beat) is lower than normal (normal is between 50%-70%). This can cause blood to back up into the lungs and cause shortness of breath and swelling of the lower extremities. Typically treated with increase activity, salt and fluid restrictions, and medications.   Also called heart failure with reduced ejection fraction (HFrEF).     Symptoms:  Shortness of breath, tiredness, weakness, swelling in feet, ankles, legs, or abdomen. Lasting cough or wheezing, fast or irregular heartbeat, dizziness, confusion, more need to pee at night, nausea, lack of appetite    Causes:  High blood pressure: If you have this, your heart has to work harder to pump more blood through your body. With that extra work, your heart muscle gets thicker and doesn't work as well.   Coronary artery disease: The amount of blood flowing to your heart is blocked, or it's less than normal.   Cardiomyopathy: When your heart muscle is damaged, your heart can't pump blood as normal.       Edenilson TRISTAN RN  CHF Care Coordinator   [phone] Option #8

## 2023-02-27 NOTE — PROGRESS NOTES
Clinic Care Coordination Contact    Clinic Care Coordination Contact  OUTREACH    Referral Information:  Referral Source: Care Team (Dr. Roberts)    Primary Diagnosis: CHF    Chief Complaint   Patient presents with     Clinic Care Coordination - Initial     Heart Failure        Universal Utilization:   Clinic Utilization  Difficulty keeping appointments:: No  Compliance Concerns: No  No-Show Concerns: No  Utilization    Hospital Admissions  0             ED Visits  2             No Show Count (past year)  0                Current as of: 2/27/2023  8:14 AM            Clinical Concerns:  Current Medical Concerns:    Digestive  Esophageal reflux  Morbid obesity (H)  BMI 40.0-44.9, adult (H)     Circulatory  Palpitations  HFrEF (heart failure with reduced ejection fraction) (H)  Regional wall motion abnormality of heart  Coronary artery disease involving native coronary artery of native heart without angina pectoris  Asymptomatic varicose veins  Left bundle branch block (LBBB)     Musculoskeletal and Integumentary  Plantar fasciitis  Heel spur, unspecified laterality     Behavioral  Major depressive disorder, recurrent episode, mild (H)  Former cigarette smoker  Generalized anxiety disorder    Current Behavioral Concerns: see above    Education Provided to patient: yes   Pain  Pain (GOAL):: No  Health Maintenance Reviewed:    Clinical Pathway: Clinic Care Coordination CHF Assessment    CHF:    Home scale available:  No- but states she will get one  Home scale weight this morning: N/A  Current weight:   Wt Readings from Last 2 Encounters:   02/27/23 114.7 kg (252 lb 14.4 oz)   01/16/23 115.2 kg (254 lb)      Heart Failure Zones sheet on refrigerator or available: no- CC will mail out  What number to call for YELLOW zones:  [phone] option #8    Symptom Review:   Heart Failure Symptoms  Shortness of breath:: Yes  Shortness of breath symptom course:: Stable  Waking up at night short of breath?: No  Prop up on pillows or  "sleep in chair to breathe easier? : No  Trouble walking or talking while short of breath?: No  Wheezing or noisy breathing?: No  Cough: No  Increased sputum: No  Fever: No  Chest pain: : No  Dizzy or Lightheaded: No  Checking weight daily? : No  Diet:: Other (salt and fluid restriction)  Appetite:: Normal  Bloating:: None  Fatigue: No  Emotional:: Worry  What Heart Failure zone are you currently in?: Yellow  Overall your CHF symptoms are (GOAL):: Stable  How confident are you with the plan we have identified?: 8    Medications:  \"Do you have questions about your medications?\"  no  Is patient on Warfarin?  No  Is Ejection Fraction <40%: Yes:   Medication list updated as needed.    Medication Management:  Medication review status: Medications reviewed.  Changes noted per patient report.    Functional Status:  Dependent ADLs:: Independent  Dependent IADLs:: Independent  Bed or wheelchair confined:: No  Mobility Status: Independent    Living Situation:  Current living arrangement:: I live in a private home with family (twin sons)  Type of residence:: Private home - \[facility] [place] [facility]\""    Lifestyle & Psychosocial Needs:    Social Determinants of Health     Tobacco Use: Medium Risk     Smoking Tobacco Use: Former     Smokeless Tobacco Use: Unknown     Passive Exposure: Not on file   Alcohol Use: Not on file   Financial Resource Strain: Not on file   Food Insecurity: Not on file   Transportation Needs: Not on file   Physical Activity: Not on file   Stress: Not on file   Social Connections: Not on file   Intimate Partner Violence: Not on file   Depression: Not at risk     PHQ-2 Score: 0   Housing Stability: Not on file     Diet:: Other (salt and fluid restriction)  Inadequate nutrition (GOAL):: No  Tube Feeding: No  Inadequate activity/exercise (GOAL):: Yes  Significant changes in sleep pattern (GOAL): No  Transportation means:: None     Lutheran or spiritual beliefs that impact treatment:: No  Mental health DX:: No  Mental health " management concern (GOAL):: No  Informal Support system:: Children     Resources and Interventions:  Current Resources:         Supplies Currently Used at Home: None  Equipment Currently Used at Home: none     Care Plan:    Patient/Caregiver understanding: yes       Future Appointments                 In 1 week [id number] HI NUCLEAR MEDICINE, [place] [facility]     In 1 week HINM1 HI NUCLEAR MEDICINE, [place] [facility]     In 1 week HI STRESS PROVIDER; HIXRRN; HI STRESS RM1 HI CARDIAC SERVICES, [place] [facility]     In 1 week HINM1 HI NUCLEAR MEDICINE, [place] [facility]     In 1 month Memo Roberts, DO [facility] [place], [place]          Plan:   Met with patient to enroll in HF CC at the request of Dr. Roberts. Patient engaged and asking questions regarding her HFrEF. EF currently 20-30% per most recent echocardiogram. Went over in detail salt and fluid restriction. Also patient expressed that she didn't believe in scales but after explaining to her that we look at the trends of weight gain/and loss and not so much the exact number she felt more comfortable and agreed to get a scale and start doing daily weights. Explained that steady/suddent weight gain can be an early sign of HF exacerbation and if we can catch it at that time we can prevent ER visit or even possible hospitalization. Patient did verbalize understanding to this. Patient also expressed worry about activity due to HF. It was explained that activity is encourage for HF but not to work out to the point of exhaustion. CC and patient went over how to read nutrition labels and importance of medication compliance. Patient was given CHF self help guide, nutrition information, CC contact information, and CHF support group information. Mailed out heart failure action plan and CC welcome letter 3/1/23.    Plant today:  Jardiance 10 mg daily.   Entresto 24-26 mg twice daily.   Metoprolol succinate 50 mg daily.   Cardiac rehab referral.  Asprin 81 mg  daily  Follow up in 1-2 months or sooner with issues.     CC will continue to monitor and remain available for patient.     Edenilson TRISTAN RN  CHF Care Coordinator   [phone] Option #8  Ext. *[phone]

## 2023-03-02 ENCOUNTER — PATIENT OUTREACH (OUTPATIENT)
Dept: CARE COORDINATION | Facility: OTHER | Age: 58
End: 2023-03-02

## 2023-03-02 NOTE — PROGRESS NOTES
Clinic Care Coordination Contact  Care Team Conversations    Outreach to patient in regards to follow up of initial CC HF enrollment on 2-27-23.   No answer.   Left message with son to return call.        Able to get scale?    Weight today?    SOB?    Increase activity?    Swelling?    Able to  medications?    Medication compliance?    Overall feeling?      HFrEF 20-30  Stress test on 3-8-23  Follow up Dr. Roberts on 4-3-23  Meds- entresto, jardiance, metoprolol, toney TRISTAN RN  CHF Care Coordinator   [phone] Option #8

## 2023-03-08 ENCOUNTER — HOSPITAL ENCOUNTER (OUTPATIENT)
Dept: NUCLEAR MEDICINE | Facility: HOSPITAL | Age: 58
Setting detail: NUCLEAR MEDICINE
Discharge: HOME | End: 2023-03-08
Attending: STUDENT IN AN ORGANIZED HEALTH CARE EDUCATION/TRAINING PROGRAM
Payer: COMMERCIAL

## 2023-03-08 ENCOUNTER — HOSPITAL ENCOUNTER (OUTPATIENT)
Dept: CARDIOLOGY | Facility: HOSPITAL | Age: 58
Setting detail: NUCLEAR MEDICINE
Discharge: HOME | End: 2023-03-08
Attending: STUDENT IN AN ORGANIZED HEALTH CARE EDUCATION/TRAINING PROGRAM
Payer: COMMERCIAL

## 2023-03-08 DIAGNOSIS — I50.20 HFREF (HEART FAILURE WITH REDUCED EJECTION FRACTION) (H): ICD-10-CM

## 2023-03-08 PROCEDURE — A9500 TC99M SESTAMIBI: HCPCS | Performed by: RADIOLOGY

## 2023-03-08 PROCEDURE — 343N000001 HC RX 343: Performed by: RADIOLOGY

## 2023-03-08 PROCEDURE — 93018 CV STRESS TEST I&R ONLY: CPT | Performed by: INTERNAL MEDICINE

## 2023-03-08 PROCEDURE — 93017 CV STRESS TEST TRACING ONLY: CPT

## 2023-03-08 PROCEDURE — 93016 CV STRESS TEST SUPVJ ONLY: CPT | Performed by: INTERNAL MEDICINE

## 2023-03-08 PROCEDURE — 250N000011 HC RX IP 250 OP 636: Performed by: INTERNAL MEDICINE

## 2023-03-08 PROCEDURE — 78452 HT MUSCLE IMAGE SPECT MULT: CPT

## 2023-03-08 RX ORDER — REGADENOSON 0.08 MG/ML
0.4 INJECTION, SOLUTION INTRAVENOUS ONCE
Status: COMPLETED | OUTPATIENT
Start: 2023-03-08 | End: 2023-03-08

## 2023-03-08 RX ORDER — AMINOPHYLLINE 25 MG/ML
INJECTION, SOLUTION INTRAVENOUS
Status: DISCONTINUED
Start: 2023-03-08 | End: 2023-03-08 | Stop reason: WASHOUT

## 2023-03-08 RX ADMIN — Medication 31.8 MILLICURIE: at 09:25

## 2023-03-08 RX ADMIN — Medication 10.2 MILLICURIE: at 07:25

## 2023-03-08 RX ADMIN — REGADENOSON 0.4 MG: 0.08 INJECTION, SOLUTION INTRAVENOUS at 09:25

## 2023-03-08 NOTE — PROGRESS NOTES
Clinic Care Coordination Contact  Care Team Conversations    Outreach to patient in regards to follow up of initial CC HF enrollment on 2-27-23.   No answer.   Left message with son to return call.    Edenilson TRISTAN RN  CHF Care Coordinator   [phone] Option #8  Ext. *[phone]

## 2023-03-09 LAB
CV BLOOD PRESSURE: 15 MMHG
CV STRESS MAX HR HE: 83
NUC STRESS EJECTION FRACTION: 32 %
RATE PRESSURE PRODUCT: 8632
STRESS ECHO BASELINE DIASTOLIC HE: 58
STRESS ECHO BASELINE HR: 53 BPM
STRESS ECHO BASELINE SYSTOLIC BP: 114
STRESS ECHO CALCULATED PERCENT HR: 51 %
STRESS ECHO LAST STRESS DIASTOLIC BP: 60
STRESS ECHO LAST STRESS SYSTOLIC BP: 104
STRESS ECHO TARGET HR: 164

## 2023-03-15 ENCOUNTER — HOSPITAL ENCOUNTER (OUTPATIENT)
Facility: CLINIC | Age: 58
End: 2023-03-15
Attending: INTERNAL MEDICINE | Admitting: INTERNAL MEDICINE
Payer: COMMERCIAL

## 2023-03-15 ENCOUNTER — PATIENT OUTREACH (OUTPATIENT)
Dept: CARE COORDINATION | Facility: OTHER | Age: 58
End: 2023-03-15

## 2023-03-15 DIAGNOSIS — R93.1 REGIONAL WALL MOTION ABNORMALITY OF HEART: ICD-10-CM

## 2023-03-15 DIAGNOSIS — R94.39 ABNORMAL CARDIOVASCULAR STRESS TEST: ICD-10-CM

## 2023-03-15 DIAGNOSIS — I50.20 HFREF (HEART FAILURE WITH REDUCED EJECTION FRACTION) (H): ICD-10-CM

## 2023-03-15 DIAGNOSIS — I25.10 CORONARY ARTERY DISEASE INVOLVING NATIVE CORONARY ARTERY OF NATIVE HEART WITHOUT ANGINA PECTORIS: ICD-10-CM

## 2023-03-15 DIAGNOSIS — I50.20 HFREF (HEART FAILURE WITH REDUCED EJECTION FRACTION) (H): Primary | ICD-10-CM

## 2023-03-15 DIAGNOSIS — I44.7 LEFT BUNDLE BRANCH BLOCK (LBBB): ICD-10-CM

## 2023-03-15 DIAGNOSIS — R60.0 PERIPHERAL EDEMA: ICD-10-CM

## 2023-03-15 RX ORDER — SODIUM CHLORIDE 9 MG/ML
INJECTION, SOLUTION INTRAVENOUS CONTINUOUS
Status: CANCELLED | OUTPATIENT
Start: 2023-03-15

## 2023-03-15 RX ORDER — POTASSIUM CHLORIDE 1500 MG/1
40 TABLET, EXTENDED RELEASE ORAL
Status: CANCELLED | OUTPATIENT
Start: 2023-03-15

## 2023-03-15 RX ORDER — POTASSIUM CHLORIDE 1500 MG/1
20 TABLET, EXTENDED RELEASE ORAL
Status: CANCELLED | OUTPATIENT
Start: 2023-03-15

## 2023-03-15 RX ORDER — LIDOCAINE 40 MG/G
CREAM TOPICAL
Status: CANCELLED | OUTPATIENT
Start: 2023-03-15

## 2023-03-15 RX ORDER — FUROSEMIDE 20 MG
20 TABLET ORAL DAILY PRN
Qty: 30 TABLET | Refills: 1 | Status: SHIPPED | OUTPATIENT
Start: 2023-03-15 | End: 2023-08-08

## 2023-03-15 NOTE — PROGRESS NOTES
"Clinic Care Coordination Contact  Care Team Conversations    Outreach to patient to follow up from initial enrollment and to schedule her for her angiogram.     CC was unable to reach patient after several attempts last week and she did not return calls. She states that she works a lot and that she did not get the messages left for her.     She states that she was able to  her newly prescribed medications without issue and has gotten a scale. Reports she has been compliant with her daily medications as well. States she also has had an increase in her energy.   States her swelling is \"not bad at all\".   Has however had an increase in weight.   Wt Readings from Last 2 Encounters:   02/27/23 114.7 kg (252 lb 14.4 oz)   01/16/23 115.2 kg (254 lb)     Weight 3/15/23 is 258 lbs. Which is a 6 lb weight gain from last office visit on 2-27-23.   Patient states she has not been watching her salt and fluid intake and has been eating things like tacos. Then reports that she read that medications like metoprolol also have side effect of weight gain. She started naming off other things that could be cause of weight gain.   CC stated it sounded like she was using other things as an excuse instead of taking responsibility of not watching her sodium and fluid intake.   Eventually patient did agree she was making excuses for her actions.   We discussed that although Dr. Roberts did prescribe lasix 20 mg PRN that the medication alone will not be enough to get rid of the fluid and help her heart. She needs to use the tools and education she was given. CC and medications can only help so much, but she also needs to put in the work to be successful. Patient did agree. As it was late in the day CC instructed her to take the PRN lasix tomorrow 03/16/23 in the morning so she won't be up all night urinating. Patient agreeable to this. Patient states she will try and work harder on diet and activity. CC will call next week to get updated " weight and patient progress.   CC will continue to monitor and remain available for patient.     Edenilson TRISTAN RN  CHF Care Coordinator   [phone] Option #8  Ext. *[phone]

## 2023-03-15 NOTE — PROGRESS NOTES
Pre-op- NOT NEEDED  Angiogram- Friday 3-24-23 arriving at 1030AM  Follow up with Dr. Roberts- keep appt on Monday 4-3-23 arriving on 845AM      Patient Education    1. Your arrival time is 1030AM on Friday 3-24-23.  Location is [facility] - [address] [place] [address] - [facility] Waiting Room  2. Please plan on being at the hospital all day.  3. At any time, emergencies and/or urgent cases may come up which could delay the start of your procedure.  4. You will not need a pre-op.     Pre-procedure instructions - Coronary Angiogram  - Shower in the evening before or the morning of the procedure  - Take your temperature in the morning prior to coming in.  If your temperature is 100 F please call [phone] (Opt. 1) and notify them.  If you do not have access to a thermometer at home, please come in for testing.  If you are running a temperature your procedure may be rescheduled.  - Nothing to eat or drink after midnight  - You can take your morning medications (except for diabetic and blood thinners) with sips of water.  - Take 325 mg of Asprin 24 hours prior to the procedure and the morning of procedure.   - You will need to arrange a ride to drop you off and pick you up, as you will be unable to drive home.  Prior to discharge you may be required to lay flat for approximately 2-4 hours in the recovery unit to ensure proper clotting of the artery.              Diabetic Medication Instructions  ? DO NOT take any oral diabetic medication, short-acting diabetes medications/insulin, humalog or regular insulin the morning of your test  ? Take   dose of long-acting insulin (Lantus, Levemir) the day of your test  ? Hold Oral Diabetic on the day of the procedure and for 48 hours after IV contrast given- METFORMIN & JARDIANCE  ? Remember to  bring your glucometer and insulin with you to take after your test if needed              Anticoagulation Medication Instructions   TAKE  YOUR DAILY ASPIRIN    If you have further questions, please utilize [employer] to contact us.   If your question concerns the above instructions, contact:   Edenilson MAYNARD RN   Cardiology Care Coordinator   [phone] option #8    If your question concerns the schedule/appointment times, contact:   Margoth BUTLER, Cath Lab Scheduling  [phone]

## 2023-03-15 NOTE — PROGRESS NOTES
Outreach to patient to give instructions below. Patient verbalized understanding. Also sent vis mychart. Surgical scrub left at  for patient to  at her convenience. Agrees to call with questions or concerns.

## 2023-03-24 ENCOUNTER — PATIENT OUTREACH (OUTPATIENT)
Dept: CARE COORDINATION | Facility: OTHER | Age: 58
End: 2023-03-24

## 2023-03-24 NOTE — PROGRESS NOTES
Clinic Care Coordination Contact  Care Team Conversations    Outreach to patient for follow up on phone call from last week.   After chart review CC saw that patient rescheduled angiogram from 3-24-23 to 4-14-23. Patient will now need pre-op and to reschedule follow up with Dr. Roberts.       Wt Readings from Last 2 Encounters:   02/27/23 : 114.7 kg (252 lb 14.4 oz)   01/16/23 : 115.2 kg (254 lb)     3-15- 258 lbs       Previous weight 258 lbs on 3-15-23    Diuretic furosemide (LASIX) 20 MG tablet- Take 1 tablet (20 mg) by mouth daily as needed (swelling with 3 pound wt gain in 24 hrs and 5 pounds in 7 days) - Oral    No answer- left message with roommate to return call to CC.     Edenilson TRISTAN RN  CHF Care Coordinator   [phone] Option #8  Ext. *[phone]

## 2023-03-27 NOTE — PROGRESS NOTES
Clinic Care Coordination Contact  Care Team Conversations    Outreach to patient regarding notes below. No answer and no voicemail. Will attempt again another time.

## 2023-03-31 ENCOUNTER — TELEPHONE (OUTPATIENT)
Dept: FAMILY MEDICINE | Facility: OTHER | Age: 58
End: 2023-03-31

## 2023-03-31 NOTE — TELEPHONE ENCOUNTER
Attempt # 1  Outcome: Left Message   Comment: left message with friend that answered the phone. States they will have the patient call back.

## 2023-03-31 NOTE — PROGRESS NOTES
[employer] message sent to patient to schedule pre-op as she has not responded or returned CC phone call. Will also route to [facility] PAC.    PAC team- please try and call patient to schedule pre-op for angiogram. Needs to be within 30 days of procedure which is on 4-14-23. Also can reschedule angiogram follow up with Dr. Roberts as it will not be done by her original follow up on 4-3-23.  Thank you.

## 2023-03-31 NOTE — TELEPHONE ENCOUNTER
patient unavailable, the person that answered the phone stated they will have her call back to schedule a pre-op.      pre-op / angiogram / DOS 4-14-23

## 2023-04-03 ENCOUNTER — OFFICE VISIT (OUTPATIENT)
Dept: CARDIOLOGY | Facility: OTHER | Age: 58
End: 2023-04-03
Attending: INTERNAL MEDICINE
Payer: COMMERCIAL

## 2023-04-03 VITALS
HEART RATE: 60 BPM | DIASTOLIC BLOOD PRESSURE: 58 MMHG | OXYGEN SATURATION: 98 % | SYSTOLIC BLOOD PRESSURE: 100 MMHG | WEIGHT: 250 LBS | HEIGHT: 64 IN | BODY MASS INDEX: 42.68 KG/M2 | TEMPERATURE: 97.1 F

## 2023-04-03 DIAGNOSIS — Z87.891 FORMER CIGARETTE SMOKER: ICD-10-CM

## 2023-04-03 DIAGNOSIS — R93.1 REGIONAL WALL MOTION ABNORMALITY OF HEART: ICD-10-CM

## 2023-04-03 DIAGNOSIS — I44.7 LEFT BUNDLE BRANCH BLOCK (LBBB): ICD-10-CM

## 2023-04-03 DIAGNOSIS — I83.90 ASYMPTOMATIC VARICOSE VEINS: ICD-10-CM

## 2023-04-03 DIAGNOSIS — R00.2 PALPITATIONS: ICD-10-CM

## 2023-04-03 DIAGNOSIS — E66.01 MORBID OBESITY (H): ICD-10-CM

## 2023-04-03 DIAGNOSIS — I25.10 CORONARY ARTERY DISEASE INVOLVING NATIVE CORONARY ARTERY OF NATIVE HEART WITHOUT ANGINA PECTORIS: ICD-10-CM

## 2023-04-03 DIAGNOSIS — R94.39 ABNORMAL CARDIOVASCULAR STRESS TEST: ICD-10-CM

## 2023-04-03 DIAGNOSIS — I50.20 HFREF (HEART FAILURE WITH REDUCED EJECTION FRACTION) (H): Primary | ICD-10-CM

## 2023-04-03 DIAGNOSIS — R60.0 PERIPHERAL EDEMA: ICD-10-CM

## 2023-04-03 PROCEDURE — 99215 OFFICE O/P EST HI 40 MIN: CPT | Performed by: INTERNAL MEDICINE

## 2023-04-03 PROCEDURE — G0463 HOSPITAL OUTPT CLINIC VISIT: HCPCS

## 2023-04-03 RX ORDER — SACUBITRIL AND VALSARTAN 49; 51 MG/1; MG/1
1 TABLET, FILM COATED ORAL 2 TIMES DAILY
Qty: 120 TABLET | Refills: 3 | Status: SHIPPED | OUTPATIENT
Start: 2023-04-03 | End: 2024-02-15

## 2023-04-03 ASSESSMENT — PAIN SCALES - GENERAL: PAINLEVEL: NO PAIN (0)

## 2023-04-03 NOTE — PROGRESS NOTES
[facility]   CARDIOLOGY PROGRESS NOTE     Chief Complaint   Patient presents with     Follow Up          Diagnosis:  1.  HFrEF. 25-30% on 12/16/22.  2.  WMA-Anteroseptal and inferoseptal wall hypokinesis on 12/16/22.  3.  GERD.  4.  Morbid obesity.   5.  CAD-concern for.   6.  LBBB.  7.  Varicose veins.   8.  Hyperlipidemia-controlled.   9.  LBBB.      Assessment/Plan:    1.  CHF: Systolic in nature.  Potentially related to weight, idiopathic, COVID-19 and June 2022, or familial.    Possible ischemic in nature with wall motion abnormality on her echocardiogram with questionably abnormal stress test.  Scheduled for cardiac cath on 4/14/2023.  Salt restriction, fluid restriction, and daily weights.  Continue medical management with Jardiance 10 mg daily, Lasix 20 mg as needed, metoprolol 25 mg XL daily and increase Entresto to 49/51 mg twice a day.  2.  Concern for CAD: She had wall motion abnormalities with a reduced EF with progressive and increasing acid reflux.  Discussed her stress test which was questionably abnormal.  She was told to take 81 mg of aspirin daily. Scheduled for cardiac catheterization on 4/14/2023 at [facility].  3.  LBBB: EKG today shows left bundle branch block. Potentially related to heart failure/CAD.  4.  Varicose veins: She understands that this can be treated but may be considered cosmetic at this point.   5.  Follow-up after completion of cardiac catheterization.    Interval history:  Marita is being seen in follow-up.  This was intended to be follow-up after the angiogram.  However, the date had to be changed.  She has not had any change in symptoms since last seen.  We discussed heart failure.  Will increase Entresto.  She believes she is improved on Entresto.  She has been taking her diuretic more frequently.  Her weight has been fluctuating between 250 and 258 pounds.  She has been following her blood pressures.  She has been focused on diastolic.  She is concerned with swelling  "to her left leg as she has more varicose veins.  She is concerned this is related to her left bundle branch block.  She struggles with redness to her left hand when placing a blood pressure monitor.  Her left hand becomes red but when checking her blood pressure right hand, there is no color changes.  She believes this is related left bundle branch block.  Questions were answered today.  Only Entresto was increased.  We will follow-up after cardiac catheterization.      HPI:    Mrs. Ndiaye is being seen by cardiology for new onset heart failure.  She has been having palpitations since 2015.  Believes she had a \"electrical event in 2017 or 2018\" has been following up with her primary care physician for palpitations.  Echocardiogram and Zio patch obtained.  Patient noted to have reduced EF at 25-30%.  This was a new diagnosis.  She was referred to cardiology as a result.     Gabbie states that she has been having palpitations since 2015.  She has occasional fluttering in her chest.  She had seen a provider in 2017 or 2018 and was told she had \"an electrical event\".  Since that time, she has battled with palpitations.  She is followed up with the clinic and underwent Zio patch and echocardiogram.  She is known to have a left bundle branch block but no history of heart disease or stenting/bypass.     She underwent an echocardiogram on 12/16/2022.  EF was 25-30% with wall motion abnormalities.  She has been having heartburn which she has had since high school years.  She has had to take more Pepcid but seems to be improved with this medication.  She has not noticed any tightness, pressure, arm, neck, or intrascapular discomfort with activity such as walking up and down stairs in her house.  She has not had significant swelling or peripheral edema.     She has had some duskiness to feet, worse on the left versus right.  She has varicose veins on the left without any wounds or gangrene.  It was explained to her that " this could be surgically corrected but may be considered a cosmetic procedure as she does not have any concerning effects from this problem.  She was told that if this progresses, it could cause gangrene or unhealed ulcers which would need to be treated.     As far as palpitations.  She had a Zio patch completed on 12/16/2022.  It showed left bundle branch block with SVE, VE, x3 runs of SVT lasting up to 11 beats.  No evidence of A-fib, pauses, heart block, or atrial fibrillation.      Relevant testing:  Stress test on 3/8/23:     The nuclear stress test is abnormal.      There is a medium sized area of infarction in the apical, anteroseptal   and anterolateral segment(s) of the left ventricle.      The left ventricular ejection fraction at rest is 15%.  The left   ventricular ejection fraction at stress is 32%.      There is no prior study for comparison.     No reversible ischemia. Enlarged left ventricle with global hypokinesis     ECHO on 12/16/22:  Moderate to severe left ventricular dilation is present.  Left ventricular function is decreased. The ejection fraction is 25-30% (severely reduced).  Mild left atrial enlargement is present.  Mild mitral insufficiency is present.  Trace aortic insufficiency is present.  No aortic stenosis is present.  Mild tricuspid insufficiency is present.  Right ventricular systolic pressure is 25 mmHg above the right atrial pressure.     Zio patch on 12/16/22:  Worn for 2 days and 19 hr's.  After removing artifact, total time was 2 days and 14 hr's. Placed on 12/16/22 at 12:05 pm and completed on 12/19/22 at 6:52 am.  Underlying rhythm was sinus with BBB pattern.  Hrt rate ranged from 49 bpm, maximum heart rate of 184 bmp, averaging 76 bmp.  No significant bradycardia, pauses, Mobitz type II or 3rd degree heart block.  No atrial fibrillation on this study.  x7 triggered events and x0 diary entries.  These corresponded to SVT, NSR, SVEs and VEs.  x1 runs of VT lasting 5 beats  with a maximum heart rate of 167 bmp.    x3 runs of SVT longest lasting 11 beats with a maximum heart rate of 184 bmp.  Rare, <1% of PAC's, atrial couplets, atrial triplets, and PVC's.  No episodes of ventricular bigeminy.  No episodes of ventricular trigeminy.        ICD-10-CM    1. HFrEF (heart failure with reduced ejection fraction) (H)  I50.20 sacubitril-valsartan (ENTRESTO) 49-51 MG per tablet      2. Left bundle branch block (LBBB)  I44.7       3. Palpitations  R00.2       4. Regional wall motion abnormality of heart  R93.1 sacubitril-valsartan (ENTRESTO) 49-51 MG per tablet      5. Asymptomatic varicose veins  I83.90       6. Abnormal cardiovascular stress test on 3/8/2023  R94.39       7. Morbid obesity (H)  E66.01       8. BMI 40.0-44.9, adult (H)  Z68.41       9. Former cigarette smoker  Z87.891           Past Medical History:   Diagnosis Date     Anxiety      Depressive disorder      Gastroesophageal reflux disease      Tobacco use disorder 9/13/2005    Formatting of this note might be different from the original. Ongoing, smokes 1ppd for 20 years (20pack year hx.)       Past Surgical History:   Procedure Laterality Date     APPENDECTOMY  2004     AS REMOVAL GALLBLADDER  1988     C/SECTION, CLASSICAL  1998       Allergies   Allergen Reactions     Nsaids Hives       Current Outpatient Medications   Medication Sig Dispense Refill     acetaminophen (TYLENOL) 500 MG tablet Take 500-1,000 mg by mouth every 6 hours as needed for mild pain       aspirin (ASA) 81 MG chewable tablet Take 1 tablet (81 mg) by mouth daily 90 tablet 3     empagliflozin (JARDIANCE) 10 MG TABS tablet Take 1 tablet (10 mg) by mouth daily 90 tablet 3     FP VITAMIN E BLENDED OR        furosemide (LASIX) 20 MG tablet Take 1 tablet (20 mg) by mouth daily as needed (swelling with 3 pound wt gain in 24 hrs and 5 pounds in 7 days) 30 tablet 1     metFORMIN (GLUCOPHAGE XR) 500 MG 24 hr tablet Take 1 tablet (500 mg) by mouth 2 times daily (with  meals) for 90 days 180 tablet 0     metoprolol succinate ER (TOPROL XL) 25 MG 24 hr tablet Take 1 tablet (25 mg) by mouth daily 90 tablet 3     sacubitril-valsartan (ENTRESTO) 49-51 MG per tablet Take 1 tablet by mouth 2 times daily 120 tablet 3     POTASSIUM CHLORIDE PO  (Patient not taking: Reported on 2/27/2023)         Social History     Socioeconomic History     Marital status:      Spouse name: Not on file     Number of children: Not on file     Years of education: Not on file     Highest education level: Not on file   Occupational History     Not on file   Tobacco Use     Smoking status: Former     Smokeless tobacco: Not on file   Vaping Use     Vaping status: Never Used   Substance and Sexual Activity     Alcohol use: Yes     Comment: occassionally 1 beer     Drug use: Never     Sexual activity: Not Currently   Other Topics Concern     Not on file   Social History Narrative     Not on file     Social Determinants of Health     Financial Resource Strain: Not on file   Food Insecurity: Not on file   Transportation Needs: Not on file   Physical Activity: Not on file   Stress: Not on file   Social Connections: Not on file   Intimate Partner Violence: Not on file   Housing Stability: Not on file       LAB RESULTS:   No visits with results within 2 Month(s) from this visit.   Latest known visit with results is:   Office Visit on 10/27/2022   Component Date Value Ref Range Status     Interpretation 10/27/2022 Negative for Intraepithelial Lesion or Malignancy (NILM)    Final     Comment 10/27/2022    Final                    Value:This result contains rich text formatting which cannot be displayed here.     Specimen Adequacy 10/27/2022 Satisfactory for evaluation, endocervical/transformation zone component present   Final     Clinical Information 10/27/2022    Final                    Value:This result contains rich text formatting which cannot be displayed here.     Reflex Testing 10/27/2022 Yes regardless  "of result   Final     Previous Abnormal? 10/27/2022    Final                    Value:This result contains rich text formatting which cannot be displayed here.     Performing Labs 10/27/2022    Final                    Value:This result contains rich text formatting which cannot be displayed here.     Other HR HPV 10/27/2022 Negative  Negative Final     HPV16 DNA 10/27/2022 Negative  Negative Final     HPV18 DNA 10/27/2022 Negative  Negative Final     FINAL DIAGNOSIS 10/27/2022    Final                    Value:This result contains rich text formatting which cannot be displayed here.        Review of systems: Negative except that which was noted in the HPI.    Physical examination:  /58   Pulse 60   Temp 97.1  F (36.2  C) (Tympanic)   Ht 1.626 m (5' 4\")   Wt 113.4 kg (250 lb)   SpO2 98%   BMI 42.91 kg/m      GENERAL APPEARANCE: healthy, alert and no distress  CHEST: lungs clear to auscultation - no rales, rhonchi or wheezes, no use of accessory muscles, no retractions, respirations are unlabored, normal respiratory rate  CARDIOVASCULAR: regular rhythm, normal S1 with physiologic split S2, no S3 or S4 and no murmur, click or rub  EXTREMITIES: no clubbing, cyanosis or edema.    Total time spent on day of visit, including review of tests, obtaining/reviewing separately obtained history, ordering medications/tests/procedures, communicating with PCP/consultants, and documenting in electronic medical record: 40 minutes.               Thank you for allowing me to participate in the care of your patient. Please do not hesitate to contact me if you have any questions.     Memo Roberts, DO          "

## 2023-04-03 NOTE — PATIENT INSTRUCTIONS
Thank you for allowing Dr. Roberts and our  team to participate in your care. Please call our office at [phone] with scheduling questions or if you need to cancel or change your appointment. With any other questions or concerns you may call [facility] cardiology nurse at [phone].       If you experience chest pain, chest pressure, chest tightness, shortness of breath, fainting, lightheadedness, nausea, vomiting, or other concerning symptoms, please report to the Emergency Department or call 911. These symptoms may be emergent, and best treated in the Emergency Department.

## 2023-04-13 ENCOUNTER — TELEPHONE (OUTPATIENT)
Dept: CARDIOLOGY | Facility: CLINIC | Age: 58
End: 2023-04-13
Payer: COMMERCIAL

## 2023-04-13 NOTE — TELEPHONE ENCOUNTER
Called pt to remind  of Cardiac Cath Lab appointment on 4/14 and inform patient of updated Visitor Policy, need for  and that someone needs to stay with pt after.  Patient unavailable to talk.

## 2023-04-14 ENCOUNTER — TELEPHONE (OUTPATIENT)
Dept: CARDIOLOGY | Facility: CLINIC | Age: 58
End: 2023-04-14
Payer: COMMERCIAL

## 2023-04-25 ENCOUNTER — DOCUMENTATION ONLY (OUTPATIENT)
Dept: CARDIAC REHAB | Facility: HOSPITAL | Age: 58
End: 2023-04-25

## 2023-04-25 NOTE — PROGRESS NOTES
4/25: Cardiac rehab left voicemail in regards to scheduling cardiac rehab initial evaluation. This is staffs second attempt to patient. Call back number left was [phone].

## 2023-08-07 DIAGNOSIS — R60.0 PERIPHERAL EDEMA: ICD-10-CM

## 2023-08-07 DIAGNOSIS — I50.20 HFREF (HEART FAILURE WITH REDUCED EJECTION FRACTION) (H): ICD-10-CM

## 2023-08-08 RX ORDER — FUROSEMIDE 20 MG
TABLET ORAL
Qty: 30 TABLET | Refills: 0 | Status: SHIPPED | OUTPATIENT
Start: 2023-08-08 | End: 2024-04-29

## 2023-10-14 ENCOUNTER — HOSPITAL ENCOUNTER (EMERGENCY)
Facility: HOSPITAL | Age: 58
Discharge: HOME | End: 2023-10-14
Attending: PHYSICIAN ASSISTANT | Admitting: PHYSICIAN ASSISTANT
Payer: COMMERCIAL

## 2023-10-14 VITALS
BODY MASS INDEX: 42.68 KG/M2 | HEART RATE: 82 BPM | WEIGHT: 250 LBS | HEIGHT: 64 IN | DIASTOLIC BLOOD PRESSURE: 76 MMHG | OXYGEN SATURATION: 96 % | RESPIRATION RATE: 18 BRPM | SYSTOLIC BLOOD PRESSURE: 134 MMHG | TEMPERATURE: 99.9 F

## 2023-10-14 DIAGNOSIS — R50.9 FEBRILE ILLNESS: ICD-10-CM

## 2023-10-14 LAB
FLUAV RNA SPEC QL NAA+PROBE: NEGATIVE
FLUBV RNA RESP QL NAA+PROBE: NEGATIVE
GROUP A STREP BY PCR: NOT DETECTED
RSV RNA SPEC NAA+PROBE: NEGATIVE
SARS-COV-2 RNA RESP QL NAA+PROBE: NEGATIVE

## 2023-10-14 PROCEDURE — 99213 OFFICE O/P EST LOW 20 MIN: CPT | Performed by: PHYSICIAN ASSISTANT

## 2023-10-14 PROCEDURE — 87637 SARSCOV2&INF A&B&RSV AMP PRB: CPT | Performed by: PHYSICIAN ASSISTANT

## 2023-10-14 PROCEDURE — C9803 HOPD COVID-19 SPEC COLLECT: HCPCS

## 2023-10-14 PROCEDURE — 87651 STREP A DNA AMP PROBE: CPT | Performed by: PHYSICIAN ASSISTANT

## 2023-10-14 PROCEDURE — G0463 HOSPITAL OUTPT CLINIC VISIT: HCPCS

## 2023-10-14 NOTE — Clinical Note
Gabbie Ndiaye was seen and treated in our emergency department on 10/14/2023.  She may return to work on 10/18/2023.  Please excuse Gabbie from work through 10/18/23 as needed. She may return sooner if feeling improved and without fever >100.4F for at least 24 hours.      If you have any questions or concerns, please don't hesitate to call.      Rona Cochran PA-C

## 2023-10-15 ASSESSMENT — ENCOUNTER SYMPTOMS
SINUS PAIN: 0
FLANK PAIN: 0
WOUND: 0
CONFUSION: 0
SORE THROAT: 1
COUGH: 1
VOMITING: 0
ACTIVITY CHANGE: 1
FEVER: 1
APPETITE CHANGE: 1
CHILLS: 1
SHORTNESS OF BREATH: 0
ABDOMINAL PAIN: 0
TROUBLE SWALLOWING: 0
FATIGUE: 1
DYSURIA: 0
NAUSEA: 0
HEMATURIA: 0
BACK PAIN: 0
DIFFICULTY URINATING: 0

## 2023-10-15 NOTE — ED TRIAGE NOTES
Pt presents with c/o body aches, chills, scratchy throat, occasional sneezing. Sx started last night. Denies known exposures. Pt has been taking tylenol.

## 2023-10-15 NOTE — ED PROVIDER NOTES
History     Chief Complaint   Patient presents with    Chills     Body aches and chills     HPI  Gabbie Ndiaye is a 58 year old female who presents to urgent care with concerns of illness.  States that last night she abruptly had onset of chills and body aches.  This morning before work she took 650 mg of Tylenol x2.  As soon as the Tylenol wore off, she felt miserable again with body aches and feeling chilled.  She took some Tylenol again before presenting here.  She endorses feeling a minor raspy throat as well as a minor cough.  Denies significant abdominal pain.  Denies any urinary concerns and does not have a history of UTI.  She is vaccinated against COVID and does not know of any known exposures.    Allergies:  Allergies   Allergen Reactions    Nsaids Hives       Problem List:    Patient Active Problem List    Diagnosis Date Noted    Peripheral edema 03/15/2023     Priority: Medium    Abnormal cardiovascular stress test on 3/8/2023 03/15/2023     Priority: Medium    Regional wall motion abnormality of heart 02/27/2023     Priority: Medium    Coronary artery disease involving native coronary artery of native heart without angina pectoris 02/27/2023     Priority: Medium    Asymptomatic varicose veins 02/27/2023     Priority: Medium    Left bundle branch block (LBBB) 02/27/2023     Priority: Medium    HFrEF (heart failure with reduced ejection fraction) (H) 01/16/2023     Priority: Medium    Generalized anxiety disorder 10/27/2022     Priority: Medium    Morbid obesity (H) 09/21/2022     Priority: Medium    Plantar fasciitis 09/21/2022     Priority: Medium    Heel spur, unspecified laterality 09/21/2022     Priority: Medium    BMI 40.0-44.9, adult (H) 09/21/2022     Priority: Medium    Palpitations 09/21/2022     Priority: Medium    Former cigarette smoker 09/21/2022     Priority: Medium    Esophageal reflux 09/13/2005     Priority: Medium     Formatting of this note might be different from the  original.  GERD - Takes Protonix for heartburn      Major depressive disorder, recurrent episode, mild (H24) 09/13/2005     Priority: Medium     Formatting of this note might be different from the original.  Started Lexapro  IMO Update 10/11          Past Medical History:    Past Medical History:   Diagnosis Date    Anxiety     Depressive disorder     Gastroesophageal reflux disease     Tobacco use disorder 9/13/2005       Past Surgical History:    Past Surgical History:   Procedure Laterality Date    APPENDECTOMY  2004    AS REMOVAL GALLBLADDER  1988    C/SECTION, CLASSICAL  1998       Family History:    No family history on file.    Social History:  Marital Status:   [4]  Social History     Tobacco Use    Smoking status: Former   Vaping Use    Vaping Use: Never used   Substance Use Topics    Alcohol use: Yes     Comment: occassionally 1 beer    Drug use: Never        Medications:    acetaminophen (TYLENOL) 500 MG tablet  aspirin (ASA) 81 MG chewable tablet  empagliflozin (JARDIANCE) 10 MG TABS tablet  FP VITAMIN E BLENDED OR  furosemide (LASIX) 20 MG tablet  metFORMIN (GLUCOPHAGE XR) 500 MG 24 hr tablet  metoprolol succinate ER (TOPROL XL) 25 MG 24 hr tablet  POTASSIUM CHLORIDE PO  sacubitril-valsartan (ENTRESTO) 49-51 MG per tablet          Review of Systems   Constitutional:  Positive for activity change, appetite change, chills, fatigue and fever.   HENT:  Positive for sore throat. Negative for sinus pain and trouble swallowing.    Respiratory:  Positive for cough. Negative for shortness of breath.    Cardiovascular:  Negative for chest pain.   Gastrointestinal:  Negative for abdominal pain, nausea and vomiting.   Genitourinary:  Negative for difficulty urinating, dysuria, flank pain and hematuria.   Musculoskeletal:  Negative for back pain.   Skin:  Negative for rash and wound.   Allergic/Immunologic: Negative for immunocompromised state.   Psychiatric/Behavioral:  Negative for confusion.    All other  "systems reviewed and are negative.      Physical Exam   BP: 134/76  Pulse: 82  Temp: 99.9  F (37.7  C)  Resp: 18  Height: 162.6 cm (5' 4\")  Weight: 113.4 kg (250 lb)  SpO2: 96 %      Physical Exam  Vitals and nursing note reviewed.   Constitutional:       General: She is not in acute distress.     Appearance: She is ill-appearing. She is not toxic-appearing or diaphoretic.   HENT:      Head: Normocephalic.      Right Ear: Tympanic membrane and ear canal normal.      Left Ear: Tympanic membrane and ear canal normal.      Nose: Congestion (minor) present.      Mouth/Throat:      Mouth: Mucous membranes are moist.      Pharynx: No oropharyngeal exudate or posterior oropharyngeal erythema.   Eyes:      Extraocular Movements: Extraocular movements intact.      Conjunctiva/sclera: Conjunctivae normal.      Pupils: Pupils are equal, round, and reactive to light.   Cardiovascular:      Rate and Rhythm: Normal rate and regular rhythm.      Heart sounds: No murmur heard.     No friction rub.   Pulmonary:      Effort: Pulmonary effort is normal. No respiratory distress.      Breath sounds: Normal breath sounds. No wheezing, rhonchi or rales.   Abdominal:      General: Abdomen is flat.   Musculoskeletal:      Cervical back: Normal range of motion. No rigidity.   Skin:     General: Skin is warm.   Neurological:      General: No focal deficit present.      Mental Status: She is alert and oriented to person, place, and time.   Psychiatric:         Mood and Affect: Mood normal.         Behavior: Behavior normal.         ED Course                 Procedures              Results for orders placed or performed during the hospital encounter of 10/14/23 (from the past 24 hour(s))   Symptomatic Influenza A/B, RSV, & SARS-CoV2 PCR (COVID-19) Nasopharyngeal    Specimen: Nasopharyngeal; Swab   Result Value Ref Range    Influenza A PCR Negative Negative    Influenza B PCR Negative Negative    RSV PCR Negative Negative    SARS CoV2 PCR " Negative Negative    Narrative    Testing was performed using the Xpert Xpress CoV2/Flu/RSV Assay on the [employer] Instrument. This test should be ordered for the detection of SARS-CoV-2, influenza, and RSV viruses in individuals who meet clinical and/or epidemiological criteria. Test performance is unknown in asymptomatic patients. This test is for in vitro diagnostic use under the FDA EUA for laboratories certified under CLIA to perform high or moderate complexity testing. This test has not been FDA cleared or approved. A negative result does not rule out the presence of PCR inhibitors in the specimen or target RNA in concentration below the limit of detection for the assay. If only one viral target is positive but coinfection with multiple targets is suspected, the sample should be re-tested with another FDA cleared, approved, or authorized test, if coinfection would change clinical management. This test was validated by the [facility]. These laboratories are certified under the Clinical Laboratory Improvement Amendments of 1988 (CLIA-88) as qualified to perform high complexity laboratory testing.   Group A Streptococcus PCR Throat Swab    Specimen: Throat; Swab   Result Value Ref Range    Group A strep by PCR Not Detected Not Detected    Narrative    The Xpert Xpress Strep A test, performed on the [employer], is a rapid, qualitative in vitro diagnostic test for the detection of Streptococcus pyogenes (Group A ß-hemolytic Streptococcus, Strep A) in throat swab specimens from patients with signs and symptoms of pharyngitis. The Xpert Xpress Strep A test can be used as an aid in the diagnosis of Group A Streptococcal pharyngitis. The assay is not intended to monitor treatment for Group A Streptococcus infections. The Xpert Xpress Strep A test utilizes an automated real-time polymerase chain reaction (PCR) to detect Streptococcus pyogenes DNA.       Medications - No  data to display    Assessments & Plan (with Medical Decision Making)     I have reviewed the nursing notes.    I have reviewed the findings, diagnosis, plan and need for follow up with the patient.  Adriana presented to urgent care for evaluation of febrile illness.  On arrival she had a temperature of 37.7 Celsius.  No tachycardia, no hypoxia.  She was normotensive.  Illness had begun quite quickly accompanied by a significant body aches.  She also endorsed a minor sore throat and minor cough.  Overall most consistent with viral etiology.  Obtain strep swab which was negative.  COVID-flu-RSV test was pending at time of her departure.  She was informed of the result the next morning, all test were negative.    She had been encouraged during her visit to return should she develop other focal symptoms such as urinary concerns.  At this time it is most consistent with a viral syndrome.      Discharge Medication List as of 10/14/2023  9:00 PM          Final diagnoses:   Febrile illness       10/14/2023   [facility]       Rona Cochran PA-C  10/15/23 0954

## 2023-11-08 ENCOUNTER — HOSPITAL ENCOUNTER (EMERGENCY)
Facility: HOSPITAL | Age: 58
Discharge: HOME | End: 2023-11-08
Attending: PHYSICIAN ASSISTANT | Admitting: PHYSICIAN ASSISTANT
Payer: COMMERCIAL

## 2023-11-08 VITALS
SYSTOLIC BLOOD PRESSURE: 130 MMHG | OXYGEN SATURATION: 94 % | DIASTOLIC BLOOD PRESSURE: 69 MMHG | TEMPERATURE: 100 F | RESPIRATION RATE: 19 BRPM | HEART RATE: 77 BPM

## 2023-11-08 DIAGNOSIS — U07.1 COVID-19: ICD-10-CM

## 2023-11-08 PROCEDURE — C9803 HOPD COVID-19 SPEC COLLECT: HCPCS

## 2023-11-08 PROCEDURE — 99213 OFFICE O/P EST LOW 20 MIN: CPT | Performed by: PHYSICIAN ASSISTANT

## 2023-11-08 PROCEDURE — 94640 AIRWAY INHALATION TREATMENT: CPT

## 2023-11-08 PROCEDURE — 999N000157 HC STATISTIC RCP TIME EA 10 MIN

## 2023-11-08 PROCEDURE — 87637 SARSCOV2&INF A&B&RSV AMP PRB: CPT | Performed by: PHYSICIAN ASSISTANT

## 2023-11-08 PROCEDURE — G0463 HOSPITAL OUTPT CLINIC VISIT: HCPCS | Mod: 25

## 2023-11-08 PROCEDURE — 250N000009 HC RX 250: Performed by: PHYSICIAN ASSISTANT

## 2023-11-08 PROCEDURE — 250N000012 HC RX MED GY IP 250 OP 636 PS 637: Performed by: PHYSICIAN ASSISTANT

## 2023-11-08 RX ORDER — ALBUTEROL SULFATE 90 UG/1
2 AEROSOL, METERED RESPIRATORY (INHALATION) EVERY 6 HOURS PRN
Qty: 18 G | Refills: 0 | Status: SHIPPED | OUTPATIENT
Start: 2023-11-08

## 2023-11-08 RX ORDER — PREDNISONE 20 MG/1
TABLET ORAL
Qty: 10 TABLET | Refills: 0 | Status: SHIPPED | OUTPATIENT
Start: 2023-11-08

## 2023-11-08 RX ORDER — PREDNISONE 20 MG/1
40 TABLET ORAL ONCE
Status: COMPLETED | OUTPATIENT
Start: 2023-11-08 | End: 2023-11-08

## 2023-11-08 RX ORDER — IPRATROPIUM BROMIDE AND ALBUTEROL SULFATE 2.5; .5 MG/3ML; MG/3ML
3 SOLUTION RESPIRATORY (INHALATION) ONCE
Status: COMPLETED | OUTPATIENT
Start: 2023-11-08 | End: 2023-11-08

## 2023-11-08 RX ADMIN — IPRATROPIUM BROMIDE AND ALBUTEROL SULFATE 3 ML: .5; 3 SOLUTION RESPIRATORY (INHALATION) at 18:40

## 2023-11-08 RX ADMIN — PREDNISONE 40 MG: 20 TABLET ORAL at 19:29

## 2023-11-08 ASSESSMENT — ACTIVITIES OF DAILY LIVING (ADL): ADLS_ACUITY_SCORE: 35

## 2023-11-08 NOTE — Clinical Note
Zelda was seen and treated in our emergency department on 11/8/2023.  She may return to school on 11/13/2023.      If you have any questions or concerns, please don't hesitate to call.      Miguel Giraldo PA-C

## 2023-11-09 NOTE — ED TRIAGE NOTES
Pt presents with c/o Covid concern   Coughing, congestion/drainage, chills, headache, body aches, raspy voice, fatigue       Did have a Covid exposure from work     Tyl last night    No otc meds today

## 2023-11-09 NOTE — ED PROVIDER NOTES
History     Chief Complaint   Patient presents with    Covid Concern     HPI  Gabbie Ndiaye is a 58 year old female who presents to the urgent care today for evaluation of shortness of breath fevers chills cough congestion rhinorrhea.  Patient's Fole[employer] has been sick for about a month she has had some worsening shortness of breath in the last few days.  She has had a recent COVID exposure.  Patient states her symptoms started up again shortly after COVID exposure patient does vape she has history of being told emphysema but not being treated.    Allergies:  Allergies   Allergen Reactions    Nsaids Hives       Problem List:    Patient Active Problem List    Diagnosis Date Noted    Peripheral edema 03/15/2023     Priority: Medium    Abnormal cardiovascular stress test on 3/8/2023 03/15/2023     Priority: Medium    Regional wall motion abnormality of heart 02/27/2023     Priority: Medium    Coronary artery disease involving native coronary artery of native heart without angina pectoris 02/27/2023     Priority: Medium    Asymptomatic varicose veins 02/27/2023     Priority: Medium    Left bundle branch block (LBBB) 02/27/2023     Priority: Medium    HFrEF (heart failure with reduced ejection fraction) (H) 01/16/2023     Priority: Medium    Generalized anxiety disorder 10/27/2022     Priority: Medium    Morbid obesity (H) 09/21/2022     Priority: Medium    Plantar fasciitis 09/21/2022     Priority: Medium    Heel spur, unspecified laterality 09/21/2022     Priority: Medium    BMI 40.0-44.9, adult (H) 09/21/2022     Priority: Medium    Palpitations 09/21/2022     Priority: Medium    Former cigarette smoker 09/21/2022     Priority: Medium    Esophageal reflux 09/13/2005     Priority: Medium     Formatting of this note might be different from the original.  GERD - Takes Protonix for heartburn      Major depressive disorder, recurrent episode, mild (H24) 09/13/2005     Priority: Medium     Formatting of this note might  be different from the original.  Started Lexapro  IMO Update 10/11          Past Medical History:    Past Medical History:   Diagnosis Date    Anxiety     Depressive disorder     Gastroesophageal reflux disease     Tobacco use disorder 9/13/2005       Past Surgical History:    Past Surgical History:   Procedure Laterality Date    APPENDECTOMY  2004    AS REMOVAL GALLBLADDER  1988    C/SECTION, CLASSICAL  1998       Family History:    No family history on file.    Social History:  Marital Status:   [4]  Social History     Tobacco Use    Smoking status: Former   Vaping Use    Vaping Use: Never used   Substance Use Topics    Alcohol use: Yes     Comment: occassionally 1 beer    Drug use: Never        Medications:    acetaminophen (TYLENOL) 500 MG tablet  albuterol (PROAIR HFA/PROVENTIL HFA/VENTOLIN HFA) 108 (90 Base) MCG/ACT inhaler  aspirin (ASA) 81 MG chewable tablet  empagliflozin (JARDIANCE) 10 MG TABS tablet  FP VITAMIN E BLENDED OR  metoprolol succinate ER (TOPROL XL) 25 MG 24 hr tablet  POTASSIUM CHLORIDE PO  predniSONE (DELTASONE) 20 MG tablet  sacubitril-valsartan (ENTRESTO) 49-51 MG per tablet  furosemide (LASIX) 20 MG tablet  metFORMIN (GLUCOPHAGE XR) 500 MG 24 hr tablet          Review of Systems   All other systems reviewed and are negative.      Physical Exam   BP: 130/69  Pulse: 77  Temp: 100  F (37.8  C)  Resp: 19  SpO2: 94 %      Physical Exam  Constitutional:       General: She is not in acute distress.     Appearance: Normal appearance. She is normal weight. She is not ill-appearing, toxic-appearing or diaphoretic.   HENT:      Head: Normocephalic and atraumatic.      Right Ear: External ear normal.      Left Ear: External ear normal.      Mouth/Throat:      Lips: Pink.      Mouth: Mucous membranes are moist.      Pharynx: Oropharynx is clear.      Tonsils: No tonsillar abscesses.   Eyes:      Extraocular Movements: Extraocular movements intact.      Conjunctiva/sclera: Conjunctivae normal.       Pupils: Pupils are equal, round, and reactive to light.   Cardiovascular:      Rate and Rhythm: Normal rate.   Pulmonary:      Effort: Pulmonary effort is normal. No respiratory distress.      Breath sounds: Normal air entry. Wheezing and rhonchi present.   Musculoskeletal:         General: Normal range of motion.   Skin:     General: Skin is warm and dry.      Coloration: Skin is not jaundiced or pale.   Neurological:      Mental Status: She is alert and oriented to person, place, and time. Mental status is at baseline.      Cranial Nerves: No cranial nerve deficit.   Psychiatric:         Mood and Affect: Mood normal.         ED Course      I have reviewed the epic chart, the nurses note and triage note. vital signs were reviewed.  COVID influenza test obtained.  DuoNeb given chest x-ray obtained.    Patient feels much better with the DuoNeb.  At this point her COVID came back positive she is willing to decline doing the x-ray.  I discussed that she should have close follow-up if she was lesions getting worse come back and see us.  We will give her some prednisone and albuterol for the airway inflammation.  Close follow-up we did discuss Paxlovid at this time she would defer.       Procedures           No results found for this or any previous visit (from the past 24 hour(s)).    Medications   ipratropium - albuterol 0.5 mg/2.5 mg/3 mL (DUONEB) neb solution 3 mL (3 mLs Nebulization $Given 11/8/23 1840)   predniSONE (DELTASONE) tablet 40 mg (40 mg Oral $Given 11/8/23 1929)       Assessments & Plan (with Medical Decision Making)     I have reviewed the nursing notes.    I have reviewed the findings, diagnosis, plan and need for follow up with the patient.          Discharge Medication List as of 11/8/2023  7:26 PM        START taking these medications    Details   albuterol (PROAIR HFA/PROVENTIL HFA/VENTOLIN HFA) 108 (90 Base) MCG/ACT inhaler Inhale 2 puffs into the lungs every 6 hours as needed for shortness of  breath, wheezing or cough, Disp-18 g, R-0, E-PrescribePharmacy may dispense brand covered by insurance (Proair, or proventil or ventolin or generic albuterol inhaler)      predniSONE (DELTASONE) 20 MG tablet Take two tablets (= 40mg) each day for 5 (five) days, Disp-10 tablet, R-0, E-Prescribe             Final diagnoses:   COVID-19       11/8/2023   [facility]       Miguel Giraldo PA-C  11/08/23 1926       Miguel Giraldo PA-C  11/10/23 2196

## 2023-11-18 ENCOUNTER — MYC MEDICAL ADVICE (OUTPATIENT)
Dept: FAMILY MEDICINE | Facility: OTHER | Age: 58
End: 2023-11-18

## 2023-11-18 ASSESSMENT — ANXIETY QUESTIONNAIRES
6. BECOMING EASILY ANNOYED OR IRRITABLE: NOT AT ALL
GAD7 TOTAL SCORE: 0
4. TROUBLE RELAXING: NOT AT ALL
1. FEELING NERVOUS, ANXIOUS, OR ON EDGE: NOT AT ALL
3. WORRYING TOO MUCH ABOUT DIFFERENT THINGS: NOT AT ALL
7. FEELING AFRAID AS IF SOMETHING AWFUL MIGHT HAPPEN: NOT AT ALL
5. BEING SO RESTLESS THAT IT IS HARD TO SIT STILL: NOT AT ALL
GAD7 TOTAL SCORE: 0
IF YOU CHECKED OFF ANY PROBLEMS ON THIS QUESTIONNAIRE, HOW DIFFICULT HAVE THESE PROBLEMS MADE IT FOR YOU TO DO YOUR WORK, TAKE CARE OF THINGS AT HOME, OR GET ALONG WITH OTHER PEOPLE: NOT DIFFICULT AT ALL
2. NOT BEING ABLE TO STOP OR CONTROL WORRYING: NOT AT ALL

## 2023-11-18 ASSESSMENT — PATIENT HEALTH QUESTIONNAIRE - PHQ9
10. IF YOU CHECKED OFF ANY PROBLEMS, HOW DIFFICULT HAVE THESE PROBLEMS MADE IT FOR YOU TO DO YOUR WORK, TAKE CARE OF THINGS AT HOME, OR GET ALONG WITH OTHER PEOPLE: NOT DIFFICULT AT ALL
SUM OF ALL RESPONSES TO PHQ QUESTIONS 1-9: 2
SUM OF ALL RESPONSES TO PHQ QUESTIONS 1-9: 2

## 2023-11-20 NOTE — TELEPHONE ENCOUNTER
Completed [facility] PHQ-9/DANAE-7 for Depression Remission quality patient outreach per [facility] quality guidelines. This writer sees no major mental health and/or safety concerns at present. Currently meeting depression remission status with PHQ-9 total score <=4.         10/27/2022    10:55 AM 1/16/2023    11:12 AM 11/18/2023     3:22 PM   PHQ   PHQ-9 Total Score 13 1 2   Q9: Thoughts of better off dead/self-harm past 2 weeks Not at all Not at all Not at all          10/27/2022     2:00 PM 11/18/2023     3:23 PM   DANAE-7 SCORE   Total Score  0 (minimal anxiety)   Total Score 13 0      Cathy Sanabria RN

## 2023-12-10 ENCOUNTER — HEALTH MAINTENANCE LETTER (OUTPATIENT)
Age: 58
End: 2023-12-10

## 2024-01-10 ENCOUNTER — TELEPHONE (OUTPATIENT)
Dept: FAMILY MEDICINE | Facility: OTHER | Age: 59
End: 2024-01-10

## 2024-01-10 DIAGNOSIS — Z87.891 PERSONAL HISTORY OF TOBACCO USE: Primary | ICD-10-CM

## 2024-01-10 DIAGNOSIS — Z12.2 ENCOUNTER FOR SCREENING FOR MALIGNANT NEOPLASM OF LUNG: ICD-10-CM

## 2024-01-10 NOTE — TELEPHONE ENCOUNTER
Pt is due for annual lung cancer screening, order pended.    Isotretinoin Pregnancy And Lactation Text: This medication is Pregnancy Category X and is considered extremely dangerous during pregnancy. It is unknown if it is excreted in breast milk.

## 2024-02-13 DIAGNOSIS — R93.1 REGIONAL WALL MOTION ABNORMALITY OF HEART: ICD-10-CM

## 2024-02-13 DIAGNOSIS — I50.20 HFREF (HEART FAILURE WITH REDUCED EJECTION FRACTION) (H): ICD-10-CM

## 2024-02-15 DIAGNOSIS — I50.20 HFREF (HEART FAILURE WITH REDUCED EJECTION FRACTION) (H): Primary | ICD-10-CM

## 2024-02-15 DIAGNOSIS — R00.2 PALPITATIONS: ICD-10-CM

## 2024-02-15 DIAGNOSIS — Z13.220 LIPID SCREENING: ICD-10-CM

## 2024-02-15 DIAGNOSIS — I25.10 CORONARY ARTERY DISEASE INVOLVING NATIVE CORONARY ARTERY OF NATIVE HEART WITHOUT ANGINA PECTORIS: ICD-10-CM

## 2024-02-15 DIAGNOSIS — R60.0 PERIPHERAL EDEMA: ICD-10-CM

## 2024-02-15 DIAGNOSIS — R73.9 HYPERGLYCEMIA: ICD-10-CM

## 2024-02-15 RX ORDER — METOPROLOL SUCCINATE 25 MG/1
25 TABLET, EXTENDED RELEASE ORAL DAILY
Qty: 90 TABLET | Refills: 1 | Status: SHIPPED | OUTPATIENT
Start: 2024-02-15

## 2024-02-15 RX ORDER — SACUBITRIL AND VALSARTAN 49; 51 MG/1; MG/1
1 TABLET, FILM COATED ORAL 2 TIMES DAILY
Qty: 120 TABLET | Refills: 1 | Status: SHIPPED | OUTPATIENT
Start: 2024-02-15

## 2024-02-15 NOTE — TELEPHONE ENCOUNTER
entresto      Last Written Prescription Date:  4-3-23  Last Fill Quantity: 120,   # refills: 3  Last Office Visit: 5-3-23  Future Office visit:       Routing refill request to provider for review/approval because:  Drug not on the [facility], [facility] or [facility] refill protocol or controlled substance    Toprol xl      Last Written Prescription Date:  2-27-23  Last Fill Quantity: 90,   # refills: 3  Last Office Visit: 5-3-23  Future Office visit:       Routing refill request to provider for review/approval because:  Drug not on the [facility], [facility] or [facility] refill protocol or controlled substance

## 2024-04-05 ENCOUNTER — HOSPITAL ENCOUNTER (OUTPATIENT)
Dept: CT IMAGING | Facility: HOSPITAL | Age: 59
Discharge: HOME | End: 2024-04-05
Attending: STUDENT IN AN ORGANIZED HEALTH CARE EDUCATION/TRAINING PROGRAM | Admitting: STUDENT IN AN ORGANIZED HEALTH CARE EDUCATION/TRAINING PROGRAM
Payer: COMMERCIAL

## 2024-04-05 DIAGNOSIS — Z87.891 PERSONAL HISTORY OF TOBACCO USE: ICD-10-CM

## 2024-04-05 DIAGNOSIS — Z12.2 ENCOUNTER FOR SCREENING FOR MALIGNANT NEOPLASM OF LUNG: ICD-10-CM

## 2024-04-05 PROCEDURE — 71271 CT THORAX LUNG CANCER SCR C-: CPT

## 2024-04-12 DIAGNOSIS — R60.0 PERIPHERAL EDEMA: ICD-10-CM

## 2024-04-12 DIAGNOSIS — R93.1 REGIONAL WALL MOTION ABNORMALITY OF HEART: ICD-10-CM

## 2024-04-12 DIAGNOSIS — I50.20 HFREF (HEART FAILURE WITH REDUCED EJECTION FRACTION) (H): Primary | ICD-10-CM

## 2024-04-15 RX ORDER — EMPAGLIFLOZIN 10 MG/1
10 TABLET, FILM COATED ORAL DAILY
Qty: 90 TABLET | Refills: 3 | Status: SHIPPED | OUTPATIENT
Start: 2024-04-15

## 2024-04-15 NOTE — TELEPHONE ENCOUNTER
Empagliflozen(Jardiance) 10 mg tab      Last Written Prescription Date:  2/27/23  Last Fill Quantity: 30,   # refills: 0  Last Office Visit: 5/3/23  Future Office visit:       Routing refill request to provider for review/approval because:  Sodium Glucose Co-Transport Inhibitor Agents Failed     Patient no-showed for 5/3/23 appt..  No future appts scheduled.

## 2024-04-27 DIAGNOSIS — I50.20 HFREF (HEART FAILURE WITH REDUCED EJECTION FRACTION) (H): ICD-10-CM

## 2024-04-27 DIAGNOSIS — R60.0 PERIPHERAL EDEMA: ICD-10-CM

## 2024-04-29 DIAGNOSIS — R60.0 PERIPHERAL EDEMA: ICD-10-CM

## 2024-04-29 DIAGNOSIS — I50.20 HFREF (HEART FAILURE WITH REDUCED EJECTION FRACTION) (H): Primary | ICD-10-CM

## 2024-04-29 DIAGNOSIS — R93.1 REGIONAL WALL MOTION ABNORMALITY OF HEART: ICD-10-CM

## 2024-04-29 RX ORDER — FUROSEMIDE 20 MG
TABLET ORAL
Qty: 30 TABLET | Refills: 0 | Status: SHIPPED | OUTPATIENT
Start: 2024-04-29

## 2024-04-29 NOTE — TELEPHONE ENCOUNTER
[id number] sent Rx request for the following:      Requested Prescriptions   Pending Prescriptions Disp Refills    furosemide (LASIX) 20 MG tablet [Pharmacy Med Name: Furosemide 20 MG Oral Tablet] 30 tablet 0     Sig: TAKE 1 TABLET BY MOUTH ONCE DAILY AS NEEDED FOR SWELLING WITH 3 LB WEIGHT GAIN IN 24 HOURS AND 5 LB IN 7 DAYS       Diuretics (Including Combos) Protocol Failed - 4/27/2024  4:01 PM        Failed - Has GFR on file in past 12 months and most recent value is normal        Failed - Recent (12 mo) or future (90 days) visit within the authorizing provider's specialty     The patient must have completed an in-person or virtual visit within the past 12 months or has a future visit scheduled within the next 90 days with the authorizing provider s specialty.  Urgent care and e-visits do not quality as an office visit for this protocol.         Last Prescription Date:   8/8/23  Last Fill Qty/Refills:         30, R-0    Last Office Visit:              4/3/23   Future Office visit:           NONE    This does not meet refill criteria. Patient has not been seen by cardiology or PCP in over a year. Sent [facility] chart message for patient to make a yearly appointment. Carmen Hebert RN on 4/29/2024 at 4:39 PM

## 2024-11-09 DIAGNOSIS — R60.0 PERIPHERAL EDEMA: Primary | ICD-10-CM

## 2024-11-09 DIAGNOSIS — R00.2 PALPITATIONS: ICD-10-CM

## 2024-11-09 DIAGNOSIS — I50.20 HFREF (HEART FAILURE WITH REDUCED EJECTION FRACTION) (H): ICD-10-CM

## 2024-11-11 RX ORDER — METOPROLOL SUCCINATE 25 MG/1
25 TABLET, EXTENDED RELEASE ORAL DAILY
Qty: 90 TABLET | Refills: 3 | Status: SHIPPED | OUTPATIENT
Start: 2024-11-11

## 2024-11-11 NOTE — TELEPHONE ENCOUNTER
metoprolol succinate ER (TOPROL XL) 25 MG 24 hr tablet 90 tablet 1 2/15/2024     Last Office Visit:              4/3/23     Future Office visit:       Routing refill request to provider for review/approval because:

## 2025-01-11 ENCOUNTER — HEALTH MAINTENANCE LETTER (OUTPATIENT)
Age: 60
End: 2025-01-11

## 2025-03-09 ENCOUNTER — HEALTH MAINTENANCE LETTER (OUTPATIENT)
Age: 60
End: 2025-03-09